# Patient Record
Sex: MALE | Race: ASIAN | NOT HISPANIC OR LATINO | URBAN - METROPOLITAN AREA
[De-identification: names, ages, dates, MRNs, and addresses within clinical notes are randomized per-mention and may not be internally consistent; named-entity substitution may affect disease eponyms.]

---

## 2017-10-04 ENCOUNTER — ALLIED HEALTH/NURSE VISIT (OUTPATIENT)
Dept: NURSING | Facility: CLINIC | Age: 32
End: 2017-10-04
Payer: COMMERCIAL

## 2017-10-04 DIAGNOSIS — Z23 NEED FOR PROPHYLACTIC VACCINATION AND INOCULATION AGAINST INFLUENZA: Primary | ICD-10-CM

## 2017-10-04 PROCEDURE — 99207 ZZC NO CHARGE NURSE ONLY: CPT

## 2017-10-04 PROCEDURE — 90686 IIV4 VACC NO PRSV 0.5 ML IM: CPT

## 2017-10-04 PROCEDURE — 90471 IMMUNIZATION ADMIN: CPT

## 2017-10-04 NOTE — PROGRESS NOTES
Injectable Influenza Immunization Documentation    1.  Is the person to be vaccinated sick today?   No    2. Does the person to be vaccinated have an allergy to a component   of the vaccine?   No    3. Has the person to be vaccinated ever had a serious reaction   to influenza vaccine in the past?   No    4. Has the person to be vaccinated ever had Guillain-Barré syndrome?   No    Form completed by Karen Rolle MA// October 4, 2017 10:25 AM

## 2017-10-04 NOTE — MR AVS SNAPSHOT
After Visit Summary   10/4/2017    Jimenez Ceron    MRN: [id number]           Patient Information     Date Of Birth          1985        Visit Information        Provider Department      10/4/2017 9:00 AM DIAN NURSE AB [facility]        Today's Diagnoses     Need for prophylactic vaccination and inoculation against influenza    -  1       Follow-ups after your visit        Who to contact     If you have questions or need follow up information about today's clinic visit or your schedule please contact [facility] directly at [phone].  Normal or non-critical lab and imaging results will be communicated to you by e-Go aeroplaneshart, letter or phone within 4 business days after the clinic has received the results. If you do not hear from us within 7 days, please contact the clinic through [employer] or phone. If you have a critical or abnormal lab result, we will notify you by phone as soon as possible.  Submit refill requests through HelpMeNow or call your pharmacy and they will forward the refill request to us. Please allow 3 business days for your refill to be completed.          Additional Information About Your Visit        MyChart Information     HelpMeNow gives you secure access to your electronic health record. If you see a primary care provider, you can also send messages to your care team and make appointments. If you have questions, please call your primary care clinic.  If you do not have a primary care provider, please call [phone] and they will assist you.        Care EveryWhere ID     This is your Care EveryWhere ID. This could be used by other organizations to access your [place] medical records  [id number]         Blood Pressure from Last 3 Encounters:   10/24/16 110/62    Weight from Last 3 Encounters:   10/24/16 164 lb 7 oz (74.6 kg)              We Performed the Following     FLU VAC, SPLIT VIRUS IM > 3 YO (QUADRIVALENT) [74601]     Vaccine Administration,  Initial [[id number]]        Primary Care Provider Office Phone # Fax #    Amanda Crawford [phone] [address] [place] [address]        Equal Access to Services     [facility]STIAN : Hadii no mujica hadsamiao Soomaali, waaxda luqadaha, qaybta kaalmada adeegyada, mono dallasn madelinekaye garcía laSherrilaura espinoza. So [facility] [phone].    ATENCIÓN: Si habla español, tiene a fischer disposición servicios gratuitos de asistencia lingüística. Llame al [phone].    We comply with applicable federal civil rights laws and [place] laws. We do not discriminate on the basis of race, color, national origin, age, disability, sex, sexual orientation, or gender identity.            Thank you!     Thank you for choosing [facility]  for your care. Our goal is always to provide you with excellent care. Hearing back from our patients is one way we can continue to improve our services. Please take a few minutes to complete the written survey that you may receive in the mail after your visit with us. Thank you!             Your Updated Medication List - Protect others around you: Learn how to safely use, store and throw away your medicines at www.disposemymeds.org.      Notice  As of 10/4/2017 11:21 AM    You have not been prescribed any medications.

## 2018-02-15 ENCOUNTER — OFFICE VISIT (OUTPATIENT)
Dept: PEDIATRICS | Facility: CLINIC | Age: 33
End: 2018-02-15
Payer: COMMERCIAL

## 2018-02-15 VITALS
HEART RATE: 117 BPM | BODY MASS INDEX: 24.57 KG/M2 | OXYGEN SATURATION: 100 % | TEMPERATURE: 100.3 F | DIASTOLIC BLOOD PRESSURE: 60 MMHG | SYSTOLIC BLOOD PRESSURE: 104 MMHG | WEIGHT: 165.9 LBS | HEIGHT: 69 IN

## 2018-02-15 DIAGNOSIS — J10.1 INFLUENZA B: ICD-10-CM

## 2018-02-15 DIAGNOSIS — R07.0 THROAT PAIN: Primary | ICD-10-CM

## 2018-02-15 LAB
DEPRECATED S PYO AG THROAT QL EIA: NORMAL
FLUAV+FLUBV AG SPEC QL: NEGATIVE
FLUAV+FLUBV AG SPEC QL: POSITIVE
SPECIMEN SOURCE: ABNORMAL
SPECIMEN SOURCE: NORMAL

## 2018-02-15 PROCEDURE — 87804 INFLUENZA ASSAY W/OPTIC: CPT | Performed by: STUDENT IN AN ORGANIZED HEALTH CARE EDUCATION/TRAINING PROGRAM

## 2018-02-15 PROCEDURE — 87081 CULTURE SCREEN ONLY: CPT | Performed by: STUDENT IN AN ORGANIZED HEALTH CARE EDUCATION/TRAINING PROGRAM

## 2018-02-15 PROCEDURE — 87880 STREP A ASSAY W/OPTIC: CPT | Performed by: STUDENT IN AN ORGANIZED HEALTH CARE EDUCATION/TRAINING PROGRAM

## 2018-02-15 PROCEDURE — 99213 OFFICE O/P EST LOW 20 MIN: CPT | Mod: GE | Performed by: STUDENT IN AN ORGANIZED HEALTH CARE EDUCATION/TRAINING PROGRAM

## 2018-02-15 RX ORDER — OSELTAMIVIR PHOSPHATE 75 MG/1
75 CAPSULE ORAL 2 TIMES DAILY
Qty: 10 CAPSULE | Refills: 0 | Status: SHIPPED | OUTPATIENT
Start: 2018-02-15 | End: 2018-02-21

## 2018-02-15 NOTE — PATIENT INSTRUCTIONS
- influenza was positive; will start tamiflu twice daily for 5 days  - wash hands often especially if touching public space  - cover mouth with a mask when in public space  - as long as you have fever, influenza can be contagious  - warm water/tea with honey for cough   - cough can persist but should improve over time  - humidified air at home

## 2018-02-15 NOTE — MR AVS SNAPSHOT
After Visit Summary   2/15/2018    Jimenez Ceron    MRN: [id number]           Patient Information     Date Of Birth          1985        Visit Information        Provider Department      2/15/2018 11:15 AM Clifford Wylie MD [facility]        Today's Diagnoses     Fever    -  1    Throat pain        Influenza B          Care Instructions    - influenza was positive; will start tamiflu twice daily for 5 days  - wash hands often especially if touching public space  - cover mouth with a mask when in public space  - as long as you have fever, influenza can be contagious  - warm water/tea with honey for cough   - cough can persist but should improve over time  - humidified air at home          Follow-ups after your visit        Follow-up notes from your care team     Return if symptoms worsen or fail to improve.      Who to contact     If you have questions or need follow up information about today's clinic visit or your schedule please contact [facility] directly at [phone].  Normal or non-critical lab and imaging results will be communicated to you by Doctolibhart, letter or phone within 4 business days after the clinic has received the results. If you do not hear from us within 7 days, please contact the clinic through [employer] or phone. If you have a critical or abnormal lab result, we will notify you by phone as soon as possible.  Submit refill requests through [employer]ent or call your pharmacy and they will forward the refill request to us. Please allow 3 business days for your refill to be completed.          Additional Information About Your Visit        MyChart Information     Num[employer]ent gives you secure access to your electronic health record. If you see a primary care provider, you can also send messages to your care team and make appointments. If you have questions, please call your primary care clinic.  If you do not have a primary care provider, please call  "[phone] and they will assist you.        Care EveryWhere ID     This is your Care EveryWhere ID. This could be used by other organizations to access your [place] medical records  [id number]        Your Vitals Were     Pulse Temperature Height Pulse Oximetry BMI (Body Mass Index)       117 100.3  F (37.9  C) (Oral) 5' 8.9\" (1.75 m) 100% 24.57 kg/m2        Blood Pressure from Last 3 Encounters:   02/15/18 104/60   10/24/16 110/62    Weight from Last 3 Encounters:   02/15/18 165 lb 14.4 oz (75.3 kg)   10/24/16 164 lb 7 oz (74.6 kg)              We Performed the Following     Beta strep group A culture     Influenza A/B antigen     Rapid strep screen          Today's Medication Changes          These changes are accurate as of 2/15/18 11:46 AM.  If you have any questions, ask your nurse or doctor.               Start taking these medicines.        Dose/Directions    oseltamivir 75 MG capsule   Commonly known as:  TAMIFLU   Used for:  Influenza B   Started by:  Clifford Wylie MD        Dose:  75 mg   Take 1 capsule (75 mg) by mouth 2 times daily   Quantity:  10 capsule   Refills:  0            Where to get your medicines      These medications were sent to [facility] - [place] - [address]  AT [address]  [address] [place] [address]     Phone:  [phone]     oseltamivir 75 MG capsule                Primary Care Provider Office Phone # Fax #    Amanda Crawford [phone] [address] [place] [address]        Equal Access to Services     [facility] AH: Hadii aad ku hadasho Soomaali, waaxda luqadaha, qaybta kaalmada mono guthrie. So [facility] [phone].    ATENCIÓN: Si habla español, tiene a fischer disposición servicios gratuitos de asistencia lingüística. Llame al [phone].    We comply with applicable federal civil rights laws and [place] laws. We do not discriminate on " the basis of race, color, national origin, age, disability, sex, sexual orientation, or gender identity.            Thank you!     Thank you for choosing [facility] [place]  for your care. Our goal is always to provide you with excellent care. Hearing back from our patients is one way we can continue to improve our services. Please take a few minutes to complete the written survey that you may receive in the mail after your visit with us. Thank you!             Your Updated Medication List - Protect others around you: Learn how to safely use, store and throw away your medicines at www.disposemymeds.org.          This list is accurate as of 2/15/18 11:46 AM.  Always use your most recent med list.                   Brand Name Dispense Instructions for use Diagnosis    oseltamivir 75 MG capsule    TAMIFLU    10 capsule    Take 1 capsule (75 mg) by mouth 2 times daily    Influenza B

## 2018-02-15 NOTE — PROGRESS NOTES
SUBJECTIVE:   Jimenez Ceron is a 32 year old male who presents to clinic today for the following health issues:      RESPIRATORY SYMPTOMS      Duration: 2 days    Description  sore throat, cough, fever, chills and fatigue/malaise    Severity: severe    Accompanying signs and symptoms: None    History (predisposing factors):  none    Precipitating or alleviating factors: None    Therapies tried and outcome:  acetaminophen    Mr. Pb Baires is a 32-year-old male with significant past medical history who presents to clinic with a 2 day history of cough sore throat fever up to 102 at home. He also endorses chills, fatigue, myalgia, nausea; however denies arthralgia, vomiting, shortness of breath, chest pain, abdominal pain, diarrhea, or rash. He has been taking Tylenol for pain and the last dose was last night. No recent use of antibiotics. No known sick exposure at home or work. Received flu vaccine this year.     Problem list and histories reviewed & adjusted, as indicated.  Additional history: as documented    Patient Active Problem List   Diagnosis     Dyslipidemia     Past Surgical History:   Procedure Laterality Date     THORACOTOMY Right 1992    to remove empyema       Social History   Substance Use Topics     Smoking status: Never Smoker     Smokeless tobacco: Never Used     Alcohol use 0.0 oz/week     0 Standard drinks or equivalent per week      Comment: occasionally     Family History   Problem Relation Age of Onset     DIABETES Father      Hypothyroidism Brother          Current Outpatient Prescriptions   Medication Sig Dispense Refill     oseltamivir (TAMIFLU) 75 MG capsule Take 1 capsule (75 mg) by mouth 2 times daily 10 capsule 0     No Known Allergies    Reviewed and updated as needed this visit by clinical staff  Tobacco  Allergies  Meds  Problems  Med Hx  Surg Hx  Fam Hx  Soc Hx        Reviewed and updated as needed this visit by Provider  Allergies  Meds  Problems         ROS:  -  "please refer to HPI for complete ROS    OBJECTIVE:     /60 (BP Location: Right arm, Patient Position: Chair, Cuff Size: Adult Regular)  Pulse 117  Temp 100.3  F (37.9  C) (Oral)  Ht 5' 8.9\" (1.75 m)  Wt 165 lb 14.4 oz (75.3 kg)  SpO2 100%  BMI 24.57 kg/m2  Body mass index is 24.57 kg/(m^2).     GENERAL: Alert, appears tired and fatigued  EYES: Eyes grossly normal to inspection, conjunctivae and sclerae normal  HENT: ear canals and TM's normal, MMM, nose and mouth without ulcers or lesions, non-erythematous oropharynx  NECK: no adenopathy, no asymmetry, masses, or scars  RESP: lungs clear to auscultation - no rales, rhonchi or wheezes  CV: regular rate and rhythm, normal S1 S2, no S3 or S4, no murmur, click or rub, no peripheral edema and peripheral pulses strong  PSYCH: mentation appears normal, affect normal    Diagnostic Test Results:  Influenza - positive for B  Strep screen - Negative    ASSESSMENT/PLAN:   1. Fever  Fever up to 102 F at home and temperature at clinic today is 100.3 F. Last dose of tylenol was 2/14/18. With fever and other symptoms consistent with influenza, will test for flu  - Influenza A/B antigen    2. Throat pain  - Rapid strep screen  - Beta strep group A culture    3. Influenza B  - oseltamivir (TAMIFLU) 75 MG capsule; Take 1 capsule (75 mg) by mouth 2 times daily  Dispense: 10 capsule; Refill: 0    FUTURE APPOINTMENTS:       - Follow-up visit PRN    Patient was discussed with Dr. Ta, who agrees with the plan above    Clifford Wylie MD  [facility] [place]    I have discussed this patient with Dr. Wylie and agree with joint documentation and plan as noted above.    Josue Ta MD  Attending Internal Medicine/Pediatrics Physician    "

## 2018-02-15 NOTE — NURSING NOTE
"Chief Complaint   Patient presents with     URI       Initial /60 (BP Location: Right arm, Patient Position: Chair, Cuff Size: Adult Regular)  Pulse 117  Temp 100.3  F (37.9  C) (Oral)  Ht 5' 8.9\" (1.75 m)  Wt 165 lb 14.4 oz (75.3 kg)  SpO2 100%  BMI 24.57 kg/m2 Estimated body mass index is 24.57 kg/(m^2) as calculated from the following:    Height as of this encounter: 5' 8.9\" (1.75 m).    Weight as of this encounter: 165 lb 14.4 oz (75.3 kg).  Medication Reconciliation: complete   Isela FLETCHER      "

## 2018-02-16 LAB
BACTERIA SPEC CULT: NORMAL
SPECIMEN SOURCE: NORMAL

## 2018-02-21 ENCOUNTER — OFFICE VISIT (OUTPATIENT)
Dept: PEDIATRICS | Facility: CLINIC | Age: 33
End: 2018-02-21
Payer: COMMERCIAL

## 2018-02-21 VITALS
HEIGHT: 69 IN | SYSTOLIC BLOOD PRESSURE: 90 MMHG | TEMPERATURE: 97 F | OXYGEN SATURATION: 98 % | HEART RATE: 83 BPM | BODY MASS INDEX: 24.17 KG/M2 | DIASTOLIC BLOOD PRESSURE: 56 MMHG | WEIGHT: 163.2 LBS

## 2018-02-21 DIAGNOSIS — Z87.09 HISTORY OF INFLUENZA: ICD-10-CM

## 2018-02-21 DIAGNOSIS — R05.8 POST-VIRAL COUGH SYNDROME: Primary | ICD-10-CM

## 2018-02-21 PROCEDURE — 99213 OFFICE O/P EST LOW 20 MIN: CPT | Mod: GE | Performed by: STUDENT IN AN ORGANIZED HEALTH CARE EDUCATION/TRAINING PROGRAM

## 2018-02-21 NOTE — MR AVS SNAPSHOT
After Visit Summary   2/21/2018    Jimenez Ceron    MRN: [id number]           Patient Information     Date Of Birth          1985        Visit Information        Provider Department      2/21/2018 8:00 AM Clifford Wylie MD [facility]        Today's Diagnoses     Post-viral cough syndrome    -  1    History of influenza          Care Instructions    - continue drinking warm water/tea with honey  - ok to use tylenol or ibuprofen as needed for pain  - will monitor for symptoms for about a week; if it is improving, then will continue to monitor as outpatient; if not improving or worsening, will do a trial of tessalon or robitussin with codeine for cough  - continue with good hand hygiene at home and work              Follow-ups after your visit        Follow-up notes from your care team     Return if symptoms worsen or fail to improve.      Who to contact     If you have questions or need follow up information about today's clinic visit or your schedule please contact [facility] directly at [phone].  Normal or non-critical lab and imaging results will be communicated to you by P[employer] Solutionshart, letter or phone within 4 business days after the clinic has received the results. If you do not hear from us within 7 days, please contact the clinic through [employer] or phone. If you have a critical or abnormal lab result, we will notify you by phone as soon as possible.  Submit refill requests through [employer] or call your pharmacy and they will forward the refill request to us. Please allow 3 business days for your refill to be completed.          Additional Information About Your Visit        Ponte Solutionshart Information     [employer] gives you secure access to your electronic health record. If you see a primary care provider, you can also send messages to your care team and make appointments. If you have questions, please call your primary care clinic.  If you do not have a primary care  "provider, please call [phone] and they will assist you.        Care EveryWhere ID     This is your Care EveryWhere ID. This could be used by other organizations to access your [place] medical records  [id number]        Your Vitals Were     Pulse Temperature Height Pulse Oximetry BMI (Body Mass Index)       83 97  F (36.1  C) (Tympanic) 5' 9\" (1.753 m) 98% 24.1 kg/m2        Blood Pressure from Last 3 Encounters:   02/21/18 90/56   02/15/18 104/60   10/24/16 110/62    Weight from Last 3 Encounters:   02/21/18 163 lb 3.2 oz (74 kg)   02/15/18 165 lb 14.4 oz (75.3 kg)   10/24/16 164 lb 7 oz (74.6 kg)              Today, you had the following     No orders found for display       Primary Care Provider Office Phone # Fax #    Amanda Crawford [phone] [address] [place] [address]        Equal Access to Services     [facility]: Hadii no ku hadasho Soomaali, waaxda luqadaha, qaybta kaalmada adeegyada, mono rosas . So [facility] [phone].    ATENCIÓN: Si habla español, tiene a fischer disposición servicios gratuitos de asistencia lingüística. Llame al [phone].    We comply with applicable federal civil rights laws and [place] laws. We do not discriminate on the basis of race, color, national origin, age, disability, sex, sexual orientation, or gender identity.            Thank you!     Thank you for choosing [facility] [place]  for your care. Our goal is always to provide you with excellent care. Hearing back from our patients is one way we can continue to improve our services. Please take a few minutes to complete the written survey that you may receive in the mail after your visit with us. Thank you!             Your Updated Medication List - Protect others around you: Learn how to safely use, store and throw away your medicines at www.disposemymeds.org.      Notice  As of 2/21/2018  8:36 AM    You have not been prescribed any " medications.

## 2018-02-21 NOTE — PROGRESS NOTES
SUBJECTIVE:   Jimenez Ceron is a 32 year old male who presents to clinic today for the following health issues:      Sore throat      Duration: 1 week    Description (location/character/radiation): sore throat and cough    Intensity:  mild, moderate    Accompanying signs and symptoms: lack of appetite, inability to taste foods    History (similar episodes/previous evaluation): recent treatment for Influenza    Precipitating or alleviating factors: None    Therapies tried and outcome: Tamiflu     Mr. Ceron is a 33 yo M w/ recent dx of influenza B infection who presents to clinic today for a f/u. Finished tamiflu. Fever has resolved but still has sore throat and cough. Has been using water with honey and tea. Burning sensation with swallowing. Still eating and drinking fluid. Also, mild amount of mucus production as well. Denies nausea, vomiting, abdominal pain, diarrhea, SOB.     Problem list and histories reviewed & adjusted, as indicated.  Additional history: as documented    Patient Active Problem List   Diagnosis     Dyslipidemia     Past Surgical History:   Procedure Laterality Date     THORACOTOMY Right 1992    to remove empyema       Social History   Substance Use Topics     Smoking status: Never Smoker     Smokeless tobacco: Never Used     Alcohol use 0.0 oz/week     0 Standard drinks or equivalent per week      Comment: occasionally     Family History   Problem Relation Age of Onset     DIABETES Father      Hypothyroidism Brother          No current outpatient prescriptions on file.     No Known Allergies    Reviewed and updated as needed this visit by clinical staff  Tobacco  Allergies  Meds  Problems  Med Hx  Surg Hx  Fam Hx  Soc Hx        Reviewed and updated as needed this visit by Provider  Allergies  Meds  Problems         ROS:  CONSTITUTIONAL: NEGATIVE for fever  INTEGUMENTARY/SKIN: NEGATIVE for worrisome rashes  ENT/MOUTH: POSITIVE for sore throat and mucus production  RESP:  "POSITIVE for cough; NEGATIVE for SOB  GI: NEGATIVE for nausea, vomiting, abdominal pain, diarrhea  MUSCULOSKELETAL: NEGATIVE for significant arthralgias or myalgia      OBJECTIVE:     BP 90/56 (BP Location: Right arm, Patient Position: Sitting, Cuff Size: Adult Regular)  Pulse 83  Temp 97  F (36.1  C) (Tympanic)  Ht 5' 9\" (1.753 m)  Wt 163 lb 3.2 oz (74 kg)  SpO2 98%  BMI 24.1 kg/m2  Body mass index is 24.1 kg/(m^2).     GENERAL: healthy, alert and no distress  EYES: Eyes grossly normal to inspection, conjunctivae and sclerae normal  HENT: No nasal discharge, MMM, non-erythematous oropharynx  NECK: no adenopathy, no asymmetry, masses, or scars  RESP: lungs clear to auscultation - no rales, rhonchi or wheezes  CV: regular rate and rhythm, normal S1 S2, no S3 or S4, no murmur, click or rub, no peripheral edema and peripheral pulses strong  NEURO: Normal strength and tone, mentation intact and speech normal  PSYCH: mentation appears normal, affect normal/bright    Diagnostic Test Results:  none     ASSESSMENT/PLAN:   1. Post-viral cough syndrome  Presenting with lingering cough and sore throat likely 2/2 recent influenza infection. Has been trying warm water/tea with honey. Cough is the most common symptom that persists after viral infection and this was discussed with patient. Since it is affecting his sleep somewhat, trial of robitussin with codeine or tessalon was discussed; however, patient would like to monitor for another week before trying new medication  - continue with good hand hygiene at home and work  - continue drinking warm water/tea with honey  - ok to use tylenol or ibuprofen as needed for pain  - will monitor for symptoms for about a week; if it is improving, then will continue to monitor as outpatient; if not improving or worsening, will do a trial of tessalon or robitussin with codeine for cough    2. History of influenza  Finished with tamiflu  - continue to monitor symptoms      FUTURE " APPOINTMENTS:       - Follow-up visit PRN    Patient was discussed with Dr. Anthony Wylie MD  [facility]      I discussed this case in depth with Dr. Wylie and agree with the key components of the history, assessment and plan.      Constance Cruz MD  Internal Medicine/Pediatrics

## 2018-02-21 NOTE — PATIENT INSTRUCTIONS
- continue drinking warm water/tea with honey  - ok to use tylenol or ibuprofen as needed for pain  - will monitor for symptoms for about a week; if it is improving, then will continue to monitor as outpatient; if not improving or worsening, will do a trial of tessalon or robitussin with codeine for cough  - continue with good hand hygiene at home and work

## 2018-08-10 ENCOUNTER — OFFICE VISIT (OUTPATIENT)
Dept: PEDIATRICS | Facility: CLINIC | Age: 33
End: 2018-08-10
Payer: COMMERCIAL

## 2018-08-10 VITALS
HEIGHT: 69 IN | SYSTOLIC BLOOD PRESSURE: 108 MMHG | DIASTOLIC BLOOD PRESSURE: 60 MMHG | OXYGEN SATURATION: 97 % | HEART RATE: 73 BPM | WEIGHT: 165 LBS | BODY MASS INDEX: 24.44 KG/M2 | TEMPERATURE: 97.7 F

## 2018-08-10 DIAGNOSIS — M79.672 PAIN OF LEFT HEEL: Primary | ICD-10-CM

## 2018-08-10 PROCEDURE — 99213 OFFICE O/P EST LOW 20 MIN: CPT | Performed by: INTERNAL MEDICINE

## 2018-08-10 NOTE — PATIENT INSTRUCTIONS
Buy a set of heel cups.  Do not walk barefoot.  Use sneakers with the heel cups.    Ice the heel three times daily for 15 min or so.    Begin aleve (naproxen) 220 mg twice daily for 2 weeks.    Follow up with podiatry if symptoms persist.    Pradeep Livingston MD  Internal Medicine and Pediatrics

## 2018-08-10 NOTE — PROGRESS NOTES
SUBJECTIVE:   Jimenez Ceron is a 32 year old male who presents to clinic today for the following health issues:      Musculoskeletal problem/pain      Duration: one month    Description  Location: left foot pain    Intensity:  mild    Accompanying signs and symptoms: none    History  Previous similar problem: no   Previous evaluation:  none    Precipitating or alleviating factors:  Trauma or overuse: no   Aggravating factors include: walking and flexing foot    Therapies tried and outcome: soaking in warm water, heat pack      Left lateral ankle pain about 1 month ago.  No known injury.  No change in footwear.  Has tried heating packs a few times.  Not better or worse for last 1 month.      Problem list and histories reviewed & adjusted, as indicated.  Additional history: as documented    Patient Active Problem List   Diagnosis     Dyslipidemia     Past Surgical History:   Procedure Laterality Date     THORACOTOMY Right 1992    to remove empyema       Social History   Substance Use Topics     Smoking status: Never Smoker     Smokeless tobacco: Never Used     Alcohol use 0.0 oz/week     0 Standard drinks or equivalent per week      Comment: occasionally     Family History   Problem Relation Age of Onset     Diabetes Father      Hypothyroidism Brother          Current Outpatient Prescriptions   Medication Sig Dispense Refill     Multiple Vitamins-Minerals (MULTIVITAMIN PO)        No Known Allergies  BP Readings from Last 3 Encounters:   08/10/18 108/60   02/21/18 90/56   02/15/18 104/60    Wt Readings from Last 3 Encounters:   08/10/18 165 lb (74.8 kg)   02/21/18 163 lb 3.2 oz (74 kg)   02/15/18 165 lb 14.4 oz (75.3 kg)                  Labs reviewed in EPIC    Reviewed and updated as needed this visit by clinical staff       Reviewed and updated as needed this visit by Provider         ROS:  CONSTITUTIONAL: NEGATIVE for fever, chills, change in weight  ENT/MOUTH: NEGATIVE for ear, mouth and throat  "problems  RESP: NEGATIVE for significant cough or SOB  CV: NEGATIVE for chest pain, palpitations or peripheral edema    OBJECTIVE:                                                    /60 (BP Location: Right arm, Cuff Size: Adult Regular)  Pulse 73  Temp 97.7  F (36.5  C) (Oral)  Ht 5' 9\" (1.753 m)  Wt 165 lb (74.8 kg)  SpO2 97%  BMI 24.37 kg/m2  Body mass index is 24.37 kg/(m^2).   GENERAL: healthy, alert, well nourished, well hydrated, no distress  EXTR: left heel pain to walking; some radiation to left lateral metatarsal and lateral heel.  No ankle pain to range of motion.     Diagnostic test results:  Diagnostic Test Results:  none      ASSESSMENT/PLAN:                                                    1. Pain of left heel    Will hold on xrays until evaluation by podiatry, if necessary.       Patient Instructions   Buy a set of heel cups.  Do not walk barefoot.  Use sneakers with the heel cups.    Ice the heel three times daily for 15 min or so.    Begin aleve (naproxen) 220 mg twice daily for 2 weeks.    Follow up with podiatry if symptoms persist.    Pradeep Livingston MD  Internal Medicine and Pediatrics        - ORTHO [facility] REFERRAL      See Patient Instructions    Pradeep Livingston MD  [facility]    "

## 2018-08-10 NOTE — MR AVS SNAPSHOT
After Visit Summary   8/10/2018    Jimenez Ceron    MRN: [id number]           Patient Information     Date Of Birth          1985        Visit Information        Provider Department      8/10/2018 7:40 AM Pradeep Livingston MD [facility]        Today's Diagnoses     Pain of left heel    -  1      Care Instructions    Buy a set of heel cups.  Do not walk barefoot.  Use sneakers with the heel cups.    Ice the heel three times daily for 15 min or so.    Begin aleve (naproxen) 220 mg twice daily for 2 weeks.    Follow up with podiatry if symptoms persist.    Pradeep Livingston MD  Internal Medicine and Pediatrics             Follow-ups after your visit        Additional Services     ORTHO  REFERRAL       [facility] is referring you to the Orthopedic  Services at [facility].       The  Representative will assist you in the coordination of your Orthopedic and Musculoskeletal Care as prescribed by your physician.    The  Representative will call you within 1 business day to help schedule your appointment, or you may contact the [facility] Representative at:    All areas ~ [phone]     Type of Referral : [facility]le [facility]       Timeframe requested: 3 - 5 days    Coverage of these services is subject to the terms and limitations of your health insurance plan.  Please call member services at your health plan with any benefit or coverage questions.      If X-rays, CT or MRI's have been performed, please contact the facility where they were done to arrange for , prior to your scheduled appointment.  Please bring this referral request to your appointment and present it to your specialist.                  Follow-up notes from your care team     Return in about 4 weeks (around 9/7/2018) for Medical Check Up.      Who to contact     If you have questions or need follow up information about  "today's clinic visit or your schedule please contact [facility] directly at [phone].  Normal or non-critical lab and imaging results will be communicated to you by MyChart, letter or phone within 4 business days after the clinic has received the results. If you do not hear from us within 7 days, please contact the clinic through Intellinotehart or phone. If you have a critical or abnormal lab result, we will notify you by phone as soon as possible.  Submit refill requests through emoteShare or call your pharmacy and they will forward the refill request to us. Please allow 3 business days for your refill to be completed.          Additional Information About Your Visit        IntellinoteharLema21 Information     emoteShare gives you secure access to your electronic health record. If you see a primary care provider, you can also send messages to your care team and make appointments. If you have questions, please call your primary care clinic.  If you do not have a primary care provider, please call [phone] and they will assist you.        Care EveryWhere ID     This is your Care EveryWhere ID. This could be used by other organizations to access your [facility] medical records  [id number]        Your Vitals Were     Pulse Temperature Height Pulse Oximetry BMI (Body Mass Index)       73 97.7  F (36.5  C) (Oral) 5' 9\" (1.753 m) 97% 24.37 kg/m2        Blood Pressure from Last 3 Encounters:   08/10/18 108/60   02/21/18 90/56   02/15/18 104/60    Weight from Last 3 Encounters:   08/10/18 165 lb (74.8 kg)   02/21/18 163 lb 3.2 oz (74 kg)   02/15/18 165 lb 14.4 oz (75.3 kg)              We Performed the Following     ORTHO  REFERRAL        Primary Care Provider Office Phone # Fax #    Amanda Crawford [phone] [address] [place] [address]        Equal Access to Services     HARRIET LUBIN AH: Hadii no ayalao Soconrad, waaxda luqadaha, qaybta kaalmono kennedy " valencia winstonmonicaesther bryanSherriaajosé antonio ah. So [facility] [phone].    ATENCIÓN: Si habla erik, tiene a fischer disposición servicios gratuitos de asistencia lingüística. Huang al [phone].    We comply with applicable federal civil rights laws and [place] laws. We do not discriminate on the basis of race, color, national origin, age, disability, sex, sexual orientation, or gender identity.            Thank you!     Thank you for choosing [facility]  for your care. Our goal is always to provide you with excellent care. Hearing back from our patients is one way we can continue to improve our services. Please take a few minutes to complete the written survey that you may receive in the mail after your visit with us. Thank you!             Your Updated Medication List - Protect others around you: Learn how to safely use, store and throw away your medicines at www.disposemymeds.org.          This list is accurate as of 8/10/18  7:56 AM.  Always use your most recent med list.                   Brand Name Dispense Instructions for use Diagnosis    MULTIVITAMIN PO

## 2018-08-20 ENCOUNTER — OFFICE VISIT (OUTPATIENT)
Dept: PODIATRY | Facility: CLINIC | Age: 33
End: 2018-08-20
Payer: COMMERCIAL

## 2018-08-20 VITALS — HEIGHT: 69 IN | RESPIRATION RATE: 16 BRPM | WEIGHT: 165 LBS | BODY MASS INDEX: 24.44 KG/M2

## 2018-08-20 DIAGNOSIS — M79.672 ARCH PAIN, LEFT: Primary | ICD-10-CM

## 2018-08-20 PROCEDURE — 99243 OFF/OP CNSLTJ NEW/EST LOW 30: CPT | Performed by: PODIATRIST

## 2018-08-20 NOTE — MR AVS SNAPSHOT
After Visit Summary   8/20/2018    Jimenez Ceron    MRN: [id number]           Patient Information     Date Of Birth          1985        Visit Information        Provider Department      8/20/2018 3:45 PM Serge Hebert DPM [facility]        Care Instructions    Thank you for choosing [facility] / Foot & [facility]le Surgery!    DR. HEBERT'S CLINIC LOCATIONS:   MONDAY - [place] TUESDAY - [place]   [address]  [address]   [place] [address] [place] [address]   [phone]       THURSDAY AM - [place] THURSDAY PM - [place]   [address]   [place] [address] [place], [place] [address] [phone]       FRIDAY AM - [place] SET UP SURGERY: [phone] [address] APPOINTMENTS: [phone]   [place] [address] BILLING QUESTIONS: [phone] FAX NUMBER: [phone]     Follow Up: as needed    OVER THE COUNTER INSERTS    SuperFeet   Sofsole Fit Spenco   Power Step   Walk-Fit Arch Cradles     Most of these can be found at your local [employer], [employer], [email or web address], or online:    1.  https://[email or web address] Management Services/en-us/  2.  Https://Positionly/  3.  Https://www.GrasswiresInSphero/    **A good high quality over the counter insert should cost around $40-$50            PRICE THERAPY  Many aches and pains throughout the foot and ankle can be helped with many simple treatments. This is usually described as PRICE Therapy.      P - Protection - often times, inflammation/pain in the lower extremity is not able to improve simply because the areas involved are never allowed to rest. Every step we take can bother the problematic area. Protecting those areas is an important step in the healing process. This may involve a walking cast boot, a special insert/orthotic device, an ankle brace, or simply avoiding barefoot walking.    R - Rest - in  addition to protecting the foot/ankle, resting is an important, but often times difficult, treatment option. Getting off your feet when they bother you, and specifically avoiding activities that cause pain/discomfort, are very beneficial to prevent, and treat, foot/ankle pain.      I - Ice - icing regularly can help to decrease inflammation and swelling in the foot, thus decreasing pain. Using an ice pack or a bag of frozen veggies works very well. Ice for 20 minutes multiple times per day as needed.  Do not place the ice directly on the skin as this can cause tissue damage.    C - Compression - using a compression wrap or an ACE wrap can help to decrease swelling, which can help to decrease pain. Wearing the wraps is generally not needed at night, but they should be worn on a regular basis when you are going to be on your feet for prolonged periods as gravity tends to pull fluids down to your feet/ankles.    E - Elevation - elevating your lower extremities multiple times daily for 15-20 minutes can help to decrease swelling, which works well in decreasing pain levels.    NSAID/Tylenol - Anti-inflammatories like Aleve or ibuprofen, and/or a pain medication, such as Tylenol, can help to improve pain levels and get the issue resolved sooner rather than later. Anyone with liver issues should be careful with Tylenol, and anyone with high blood pressure or heart, stomach or kidney issues should be careful with anti-inflammatories. Please ask if you have questions about these medications, including dosage.      Body Mass Index (BMI)  Many things can cause foot and ankle problems. Foot structure, activity level, foot mechanics and injuries are common causes of pain. One very important issue that often goes unmentioned, is body weight. Extra weight can cause increased stress on muscles, ligaments, bones and tendons. Sometimes just a few extra pounds is all it takes to put one over her/his threshold. Without reducing that  "stress, it can be difficult to alleviate pain. Some people are uncomfortable addressing this issue, but we feel it is important for you to think about it. As Foot &  Ankle specialists, our job is addressing the lower extremity problem and possible causes. Regarding extra body weight, we encourage patients to discuss diet and weight management plans with their primary care doctors. It is this team approach that gives you the best opportunity for pain relief and getting you back on your feet.            Follow-ups after your visit        Who to contact     If you have questions or need follow up information about today's clinic visit or your schedule please contact [facility] directly at [phone].  Normal or non-critical lab and imaging results will be communicated to you by Decohunthart, letter or phone within 4 business days after the clinic has received the results. If you do not hear from us within 7 days, please contact the clinic through Cookappt or phone. If you have a critical or abnormal lab result, we will notify you by phone as soon as possible.  Submit refill requests through farmhopping or call your pharmacy and they will forward the refill request to us. Please allow 3 business days for your refill to be completed.          Additional Information About Your Visit        farmhopping Information     farmhopping gives you secure access to your electronic health record. If you see a primary care provider, you can also send messages to your care team and make appointments. If you have questions, please call your primary care clinic.  If you do not have a primary care provider, please call [phone] and they will assist you.        Care EveryWhere ID     This is your Care EveryWhere ID. This could be used by other organizations to access your [facility] medical records  [id number]        Your Vitals Were     Respirations Height BMI (Body Mass Index)             16 5' 9\" (1.753 m) 24.37 kg/m2          Blood " Pressure from Last 3 Encounters:   08/10/18 108/60   02/21/18 90/56   02/15/18 104/60    Weight from Last 3 Encounters:   08/20/18 165 lb (74.8 kg)   08/10/18 165 lb (74.8 kg)   02/21/18 163 lb 3.2 oz (74 kg)              Today, you had the following     No orders found for display       Primary Care Provider Office Phone # Fax #    Amanda Crawford [phone] [address] [place] [address]        Equal Access to Services     [facility]: Hadii aad ku hadasho Soconrad, waaxda luqadaha, qaybta kaalmada jerri, mono rosas . So [facility] [phone].    ATENCIÓN: Si habla español, tiene a fischer disposición servicios gratuitos de asistencia lingüística. Llame al [phone].    We comply with applicable federal civil rights laws and [place] laws. We do not discriminate on the basis of race, color, national origin, age, disability, sex, sexual orientation, or gender identity.            Thank you!     [facility]. Our goal is always to provide you with excellent care. Hearing back from our patients is one way we can continue to improve our services. Please take a few minutes to complete the written survey that you may receive in the mail after your visit with us. Thank you!             Your Updated Medication List - Protect others around you: Learn how to safely use, store and throw away your medicines at www.disposemymeds.org.          This list is accurate as of 8/20/18  4:01 PM.  Always use your most recent med list.                   Brand Name Dispense Instructions for use Diagnosis    MULTIVITAMIN PO

## 2018-08-20 NOTE — PROGRESS NOTES
"Foot & Ankle Surgery  August 20, 2018    CC: \"left foot\"    I was asked to see Jimenez Ceron regarding the chief complaint by:  Dr. EFRAIN Livingston    HPI:  Pt is a 32 year old male who presents with above complaint.  Left arch/foot pain x 30 days.  Pain 5/10 \"regularly\", he has tried heating pad, soaking.  Improving last few days, \"almost gone\".  Trip to [place], now compensatory gait, walking on side of foot.  Minimal morning pain but has post-static dyskinesia    ROS:   Pos for CC.  The patient denies current nausea, vomiting, chills, fevers, belly pain, calf pain, chest pain or SOB.  Complete remainder of ROS is otherwise neg.    VITALS:    Vitals:    08/20/18 1549   Resp: 16   Weight: 165 lb (74.8 kg)   Height: 5' 9\" (1.753 m)       PMH:  No past medical history on file.    SXHX:    Past Surgical History:   Procedure Laterality Date     THORACOTOMY Right 1992    to remove empyema        MEDS:    Current Outpatient Prescriptions   Medication     Multiple Vitamins-Minerals (MULTIVITAMIN PO)     No current facility-administered medications for this visit.        ALL:   No Known Allergies    FMH:    Family History   Problem Relation Age of Onset     Diabetes Father      Hypothyroidism Brother        SocHx:    Social History     Social History     Marital status:      Spouse name: N/A     Number of children: N/A     Years of education: N/A     Occupational History     Not on file.     Social History Main Topics     Smoking status: Never Smoker     Smokeless tobacco: Never Used     Alcohol use 0.0 oz/week     0 Standard drinks or equivalent per week      Comment: occasionally     Drug use: No     Sexual activity: Yes     Partners: Female     Other Topics Concern     Parent/Sibling W/ Cabg, Mi Or Angioplasty Before 65f 55m? No     Social History Narrative           EXAMINATION:  Gen:   No apparent distress  Neuro:   A&Ox3, no deficits  Psych:    Answering questions appropriately for age and situation with " normal affect  Head:    NCAT  Eye:    Visual scanning without deficit  Ear:    Response to auditory stimuli wnl  Lung:    Non-labored breathing on RA noted  Abd:    NTND per patient report  Lymph:    Neg for pitting/non-pitting edema BLE  Vasc:    Pulses palpable, CFT minimally delayed  Neuro:    Light touch sensation intact to all sensory nerve distributions without paresthesias  Derm:    Neg for nodules, lesions or ulcerations  MSK:    Left lower extremity - points to lateral rearfoot along lateral band of plantar fascia.  No pain on rearfoot/heel exam today, however.  Calf:    Neg for redness, swelling or tenderness      Assessment:  32 year old male with plantar left arch pain       Plan:  Discussed etiologies, anatomy and options  1.  Plantar left arch pain  -comfortable shoes; minimize shoeless ambulation  -OTC insert for arch support  -RICE/NSAID prn; discussed importance of activity modifications based on pain  -consider walking cast boot    Follow up:  prn or sooner with acute issues      Patient's medical history was reviewed today    Body mass index is 24.37 kg/(m^2).          Serge Agee DPM FACFAS FACFAOM  Podiatric Foot & Ankle Surgeon  [facility]  [phone]

## 2018-08-20 NOTE — PATIENT INSTRUCTIONS
Thank you for choosing [facility] / [facility] & [facility]le Surgery!    DR. HEBERT'S CLINIC LOCATIONS:   MONDAY - [place] TUESDAY - [place]   [address]  [address] [place]   [place] [address] [place], [place] [id number] [phone]       THURSDAY AM - [place] THURSDAY PM - [place]   [address]   [place] [address] [place], [place] [address] [phone]       FRIDAY AM - [place] SET UP SURGERY: [phone] [address] APPOINTMENTS: [phone]   [place] [address] BILLING QUESTIONS: [phone] FAX NUMBER: [phone]     Follow Up: as needed    OVER THE COUNTER INSERTS    "[employer] Fit Roundrate   Power Step   Walk-Fit Arch Cradles     Most of these can be found at your local [employer], [employer]erve [employer]., Flut[employer], or online:    1.  https://[email or web address].Brainloop/en-us/  2.  Https://The 19th Floor/  3.  Https://www.powersteps.com/    **A good high quality over the counter insert should cost around $40-$50            PRICE THERAPY  Many aches and pains throughout the foot and ankle can be helped with many simple treatments. This is usually described as PRICE Therapy.      P - Protection - often times, inflammation/pain in the lower extremity is not able to improve simply because the areas involved are never allowed to rest. Every step we take can bother the problematic area. Protecting those areas is an important step in the healing process. This may involve a walking cast boot, a special insert/orthotic device, an ankle brace, or simply avoiding barefoot walking.    R - Rest - in addition to protecting the foot/ankle, resting is an important, but often times difficult, treatment option. Getting off your feet when they bother you, and specifically avoiding activities that cause pain/discomfort, are very beneficial to prevent, and treat, foot/ankle pain.      I - Ice - icing regularly can  help to decrease inflammation and swelling in the foot, thus decreasing pain. Using an ice pack or a bag of frozen veggies works very well. Ice for 20 minutes multiple times per day as needed.  Do not place the ice directly on the skin as this can cause tissue damage.    C - Compression - using a compression wrap or an ACE wrap can help to decrease swelling, which can help to decrease pain. Wearing the wraps is generally not needed at night, but they should be worn on a regular basis when you are going to be on your feet for prolonged periods as gravity tends to pull fluids down to your feet/ankles.    E - Elevation - elevating your lower extremities multiple times daily for 15-20 minutes can help to decrease swelling, which works well in decreasing pain levels.    NSAID/Tylenol - Anti-inflammatories like Aleve or ibuprofen, and/or a pain medication, such as Tylenol, can help to improve pain levels and get the issue resolved sooner rather than later. Anyone with liver issues should be careful with Tylenol, and anyone with high blood pressure or heart, stomach or kidney issues should be careful with anti-inflammatories. Please ask if you have questions about these medications, including dosage.      Body Mass Index (BMI)  Many things can cause foot and ankle problems. Foot structure, activity level, foot mechanics and injuries are common causes of pain. One very important issue that often goes unmentioned, is body weight. Extra weight can cause increased stress on muscles, ligaments, bones and tendons. Sometimes just a few extra pounds is all it takes to put one over her/his threshold. Without reducing that stress, it can be difficult to alleviate pain. Some people are uncomfortable addressing this issue, but we feel it is important for you to think about it. As Foot &  Ankle specialists, our job is addressing the lower extremity problem and possible causes. Regarding extra body weight, we encourage patients to  discuss diet and weight management plans with their primary care doctors. It is this team approach that gives you the best opportunity for pain relief and getting you back on your feet.

## 2018-08-20 NOTE — LETTER
"    8/20/2018         RE: Jimenez Ceron  [address] [place] [address]        Dear Colleague,    Thank you for referring your patient, Jimenez Ceron, to the [facility] [place]. Please see a copy of my visit note below.    Foot & Ankle Surgery  August 20, 2018    CC: \"left foot\"    I was asked to see Jimenez Ceron regarding the chief complaint by:  Dr. EFRAIN Livingston    HPI:  Pt is a 32 year old male who presents with above complaint.  Left arch/foot pain x 30 days.  Pain 5/10 \"regularly\", he has tried heating pad, soaking.  Improving last few days, \"almost gone\".  Trip to [place], now compensatory gait, walking on side of foot.  Minimal morning pain but has post-static dyskinesia    ROS:   Pos for CC.  The patient denies current nausea, vomiting, chills, fevers, belly pain, calf pain, chest pain or SOB.  Complete remainder of ROS is otherwise neg.    VITALS:    Vitals:    08/20/18 1549   Resp: 16   Weight: 165 lb (74.8 kg)   Height: 5' 9\" (1.753 m)       PMH:  No past medical history on file.    SXHX:    Past Surgical History:   Procedure Laterality Date     THORACOTOMY Right 1992    to remove empyema        MEDS:    Current Outpatient Prescriptions   Medication     Multiple Vitamins-Minerals (MULTIVITAMIN PO)     No current facility-administered medications for this visit.        ALL:   No Known Allergies    FMH:    Family History   Problem Relation Age of Onset     Diabetes Father      Hypothyroidism Brother        SocHx:    Social History     Social History     Marital status:      Spouse name: N/A     Number of children: N/A     Years of education: N/A     Occupational History     Not on file.     Social History Main Topics     Smoking status: Never Smoker     Smokeless tobacco: Never Used     Alcohol use 0.0 oz/week     0 Standard drinks or equivalent per week      Comment: occasionally     Drug use: No     Sexual activity: Yes     Partners: Female     Other Topics Concern     " Parent/Sibling W/ Cabg, Mi Or Angioplasty Before 65f 55m? No     Social History Narrative           EXAMINATION:  Gen:   No apparent distress  Neuro:   A&Ox3, no deficits  Psych:    Answering questions appropriately for age and situation with normal affect  Head:    NCAT  Eye:    Visual scanning without deficit  Ear:    Response to auditory stimuli wnl  Lung:    Non-labored breathing on RA noted  Abd:    NTND per patient report  Lymph:    Neg for pitting/non-pitting edema BLE  Vasc:    Pulses palpable, CFT minimally delayed  Neuro:    Light touch sensation intact to all sensory nerve distributions without paresthesias  Derm:    Neg for nodules, lesions or ulcerations  MSK:    Left lower extremity - points to lateral rearfoot along lateral band of plantar fascia.  No pain on rearfoot/heel exam today, however.  Calf:    Neg for redness, swelling or tenderness      Assessment:  32 year old male with plantar left arch pain       Plan:  Discussed etiologies, anatomy and options  1.  Plantar left arch pain  -comfortable shoes; minimize shoeless ambulation  -OTC insert for arch support  -RICE/NSAID prn; discussed importance of activity modifications based on pain  -consider walking cast boot    Follow up:  prn or sooner with acute issues      Patient's medical history was reviewed today    Body mass index is 24.37 kg/(m^2).          Serge Agee DPM FACFAS FACFAOM  Podiatric Foot & Ankle Surgeon  [facility]  [phone]      Again, thank you for allowing me to participate in the care of your patient.        Sincerely,        Serge Agee DPM, ROSHAN

## 2018-10-21 ENCOUNTER — NURSE TRIAGE (OUTPATIENT)
Dept: NURSING | Facility: CLINIC | Age: 33
End: 2018-10-21

## 2018-10-21 NOTE — TELEPHONE ENCOUNTER
Jimenez has a cough and a sore throat for one week.  Denies fever.  Jimenez has tried warm fluids and ibuprofen.

## 2018-10-22 ENCOUNTER — OFFICE VISIT (OUTPATIENT)
Dept: PEDIATRICS | Facility: CLINIC | Age: 33
End: 2018-10-22
Payer: COMMERCIAL

## 2018-10-22 VITALS
DIASTOLIC BLOOD PRESSURE: 60 MMHG | BODY MASS INDEX: 25.13 KG/M2 | HEIGHT: 69 IN | TEMPERATURE: 96.9 F | HEART RATE: 92 BPM | SYSTOLIC BLOOD PRESSURE: 114 MMHG | WEIGHT: 169.7 LBS | OXYGEN SATURATION: 97 %

## 2018-10-22 DIAGNOSIS — J02.9 PHARYNGITIS, UNSPECIFIED ETIOLOGY: ICD-10-CM

## 2018-10-22 DIAGNOSIS — J20.9 ACUTE BRONCHITIS, UNSPECIFIED ORGANISM: Primary | ICD-10-CM

## 2018-10-22 PROCEDURE — 99213 OFFICE O/P EST LOW 20 MIN: CPT | Performed by: FAMILY MEDICINE

## 2018-10-22 RX ORDER — PREDNISONE 20 MG/1
20 TABLET ORAL DAILY
Qty: 3 TABLET | Refills: 0 | Status: SHIPPED | OUTPATIENT
Start: 2018-10-22 | End: 2019-03-29

## 2018-10-22 RX ORDER — BENZONATATE 200 MG/1
200 CAPSULE ORAL 3 TIMES DAILY PRN
Qty: 21 CAPSULE | Refills: 1 | Status: SHIPPED | OUTPATIENT
Start: 2018-10-22 | End: 2019-03-29

## 2018-10-22 NOTE — PROGRESS NOTES
"Subjective:   Jimenez Ceron is a 33 year old male who presents for   Chief Complaint   Patient presents with     Pharyngitis      Sick 1 week ago.  Patient is for evaluation for illness that started approximately 1 week ago.  He said his wife an ear infection early last week.  No other individuals with strep throat or any respiratory infection at home.  No vomiting or diarrhea.  No sinus pressure pain.  Denies any fevers.  He is not a smoker.  Has been trying Tylenol honey and salt water as remedies.  Slept well last night using NyQuil.. he denies any history of heart problems . No shortness of breath. No body aches reported    PMHX/PSHX/MEDS/ALLERGIES/SHX/FHX reviewed in Epic.    Patient Active Problem List    Diagnosis Date Noted     Dyslipidemia 06/30/2014     Priority: Medium       Current Outpatient Prescriptions   Medication     benzonatate (TESSALON) 200 MG capsule     Multiple Vitamins-Minerals (MULTIVITAMIN PO)     predniSONE (DELTASONE) 20 MG tablet     No current facility-administered medications for this visit.      ROS:  As above per HPI    Objective:   /60 (BP Location: Right arm, Cuff Size: Adult Large)  Pulse 92  Temp 96.9  F (36.1  C) (Tympanic)  Ht 5' 9\" (1.753 m)  Wt 169 lb 11.2 oz (77 kg)  SpO2 94%  BMI 25.06 kg/m2, Body mass index is 25.06 kg/(m^2).  Gen:  NAD, well-nourished, sitting in chair comfortably  HEENT: EOMI, sclera anicteric, Head normocephalic, ; nares patent; moist mucous membranes, tonsils 1+, mallampati I/IV  Neck: trachea midline, no thyromegaly  CV:  Hemodynamically stable, RRR  Pulm:  no increased work of breathing , CTAB, no wheezes/rales/rhonchi   Extrem: no cyanosis, edema or clubbing  Skin: no obvious rashes or abnormalities  Psych: Euthymic, linear thoughts, normal rate of speech    Assessment & Plan:   Jimenez Ceron, 33 year old male who presents with:  Acute bronchitis, unspecified organism  1 week symptoms of cough and I feel he is " undertreating his cough with [place] cough drops, recommend dextromethorphan and gave paper script for tessalon if needed. F/u in 1 week if no improvement in symptoms. 97% O2 saturation recorded on the Masimo device, no increased work of breathing.   - benzonatate (TESSALON) 200 MG capsule  Dispense: 21 capsule; Refill: 1    Pharyngitis, unspecified etiology  Sore throat but no evidence of obstruction. Will recommend 3 days of low dose prednisone and also ibuprofen. Can continue with honey as needed.   - predniSONE (DELTASONE) 20 MG tablet  Dispense: 3 tablet; Refill: 0    Andi Mayberry MD   [facility]     Options for treatment and/or follow-up care were reviewed with the patient. Jimenez Ceron and/or legal guardian was engaged and actively involved in the decision making process. Patient/guardian verbalized understanding of the options discussed and was satisfied with the final plan.

## 2018-10-22 NOTE — PATIENT INSTRUCTIONS
Stay hydrated: at least 6-8 glasses of fluid/water a day    For cough: Take robitussin every 5 hours as needed  If needing more help, fill paper prescription for the Tessalon  And take every 8 hours as needed      Sore throat:   1) Prednisone 20mg once in the morning for 3 days  2) Take ibuprofen 400mg every 6 hours      If cough is not improving one week from now, return for re-evaluation

## 2018-10-22 NOTE — MR AVS SNAPSHOT
After Visit Summary   10/22/2018    Jimenez Ceron    MRN: [id number]           Patient Information     Date Of Birth          1985        Visit Information        Provider Department      10/22/2018 8:10 AM Andi Mayberry MD [facility]        Today's Diagnoses     Acute bronchitis, unspecified organism    -  1    Pharyngitis, unspecified etiology          Care Instructions    Stay hydrated: at least 6-8 glasses of fluid/water a day    For cough: Take robitussin every 5 hours as needed  If needing more help, fill paper prescription for the Tessalon  And take every 8 hours as needed      Sore throat:   1) Prednisone 20mg once in the morning for 3 days  2) Take ibuprofen 400mg every 6 hours      If cough is not improving one week from now, return for re-evaluation          Follow-ups after your visit        Who to contact     If you have questions or need follow up information about today's clinic visit or your schedule please contact [facility] directly at [phone].  Normal or non-critical lab and imaging results will be communicated to you by Gotuithart, letter or phone within 4 business days after the clinic has received the results. If you do not hear from us within 7 days, please contact the clinic through [employer] or phone. If you have a critical or abnormal lab result, we will notify you by phone as soon as possible.  Submit refill requests through [employer] or call your pharmacy and they will forward the refill request to us. Please allow 3 business days for your refill to be completed.          Additional Information About Your Visit        Gotuithart Information     [employer] gives you secure access to your electronic health record. If you see a primary care provider, you can also send messages to your care team and make appointments. If you have questions, please call your primary care clinic.  If you do not have a primary care provider, please call  "[phone] and they will assist you.        Care EveryWhere ID     This is your Care EveryWhere ID. This could be used by other organizations to access your [facility] medical records  [id number]        Your Vitals Were     Pulse Temperature Height Pulse Oximetry BMI (Body Mass Index)       92 96.9  F (36.1  C) (Tympanic) 5' 9\" (1.753 m) 94% 25.06 kg/m2        Blood Pressure from Last 3 Encounters:   10/22/18 114/60   08/10/18 108/60   02/21/18 90/56    Weight from Last 3 Encounters:   10/22/18 169 lb 11.2 oz (77 kg)   08/20/18 165 lb (74.8 kg)   08/10/18 165 lb (74.8 kg)              Today, you had the following     No orders found for display         Today's Medication Changes          These changes are accurate as of 10/22/18  8:42 AM.  If you have any questions, ask your nurse or doctor.               Start taking these medicines.        Dose/Directions    benzonatate 200 MG capsule   Commonly known as:  TESSALON   Used for:  Acute bronchitis, unspecified organism   Started by:  Andi Mayberry MD        Dose:  200 mg   Take 1 capsule (200 mg) by mouth 3 times daily as needed for cough   Quantity:  21 capsule   Refills:  1       predniSONE 20 MG tablet   Commonly known as:  DELTASONE   Used for:  Pharyngitis, unspecified etiology   Started by:  Andi Mayberry MD        Dose:  20 mg   Take 1 tablet (20 mg) by mouth daily for 3 days   Quantity:  3 tablet   Refills:  0            Where to get your medicines      These medications were sent to [id number] - [place] - [address]  AT [address] [place] [address]     Phone:  [phone]     predniSONE 20 MG tablet         Some of these will need a paper prescription and others can be bought over the counter.  Ask your nurse if you have questions.     Bring a paper prescription for each of these medications     benzonatate 200 MG capsule                Primary Care Provider Office " Phone # Fax #    Amanda Crawford [phone] [address] [place] [address]        Equal Access to Services     EVAN LUBIN : Hadii aad ku hadsamiaflorence Moe, wasarada oraqserenaha, qayaneta kaalmada jerri, mono zanein hayaajosé antonio correakaye garcía laSherrilaura espinoza. So [facility] [phone].    ATENCIÓN: Si habla español, tiene a fischer disposición servicios gratuitos de asistencia lingüística. Llame al [phone].    We comply with applicable federal civil rights laws and [place] laws. We do not discriminate on the basis of race, color, national origin, age, disability, sex, sexual orientation, or gender identity.            Thank you!     Thank you for choosing [facility]  for your care. Our goal is always to provide you with excellent care. Hearing back from our patients is one way we can continue to improve our services. Please take a few minutes to complete the written survey that you may receive in the mail after your visit with us. Thank you!             Your Updated Medication List - Protect others around you: Learn how to safely use, store and throw away your medicines at www.disposemymeds.org.          This list is accurate as of 10/22/18  8:42 AM.  Always use your most recent med list.                   Brand Name Dispense Instructions for use Diagnosis    benzonatate 200 MG capsule    TESSALON    21 capsule    Take 1 capsule (200 mg) by mouth 3 times daily as needed for cough    Acute bronchitis, unspecified organism       MULTIVITAMIN PO           predniSONE 20 MG tablet    DELTASONE    3 tablet    Take 1 tablet (20 mg) by mouth daily for 3 days    Pharyngitis, unspecified etiology

## 2018-10-22 NOTE — PROGRESS NOTES
"Subjective:   Jimenez Ceron is a 33 year old male who presents for   Chief Complaint   Patient presents with     Pharyngitis       Patient is here for evaluation for illness that started approximately 1 week ago.  He said his wife had an ear infection early last week but no other symptoms.  No other individuals with strep throat or any respiratory infection at home.  No vomiting or diarrhea.  No sinus pressure pain.  Denies any fevers.  He is not a smoker.  Has been trying Tylenol honey and salt water as remedies.  Slept well last night using NyQuil.. he denies any history of heart problems . No shortness of breath. No body aches reported    PMHX/PSHX/MEDS/ALLERGIES/SHX/FHX reviewed in Epic.    Patient Active Problem List    Diagnosis Date Noted     Dyslipidemia 06/30/2014     Priority: Medium       Current Outpatient Prescriptions   Medication     benzonatate (TESSALON) 200 MG capsule     Multiple Vitamins-Minerals (MULTIVITAMIN PO)     predniSONE (DELTASONE) 20 MG tablet     No current facility-administered medications for this visit.      ROS:  As above per HPI    Objective:   /60 (BP Location: Right arm, Cuff Size: Adult Large)  Pulse 92  Temp 96.9  F (36.1  C) (Tympanic)  Ht 5' 9\" (1.753 m)  Wt 169 lb 11.2 oz (77 kg)  SpO2 94%  BMI 25.06 kg/m2, Body mass index is 25.06 kg/(m^2).  Gen:  NAD, well-nourished, sitting in chair comfortably  HEENT: EOMI, sclera anicteric, Head normocephalic, ; nares patent; moist mucous membranes, tonsils 1+, mallampati I/IV  Neck: trachea midline, no thyromegaly  CV:  Hemodynamically stable, RRR  Pulm:  no increased work of breathing , CTAB, no wheezes/rales/rhonchi   Extrem: no cyanosis, edema or clubbing  Skin: no obvious rashes or abnormalities  Psych: Euthymic, linear thoughts, normal rate of speech    Assessment & Plan:   Jimenez Ceron, 33 year old male who presents with:  Acute bronchitis, unspecified organism  1 week symptoms of cough and I feel he " is undertreating his cough with [place] cough drops, recommend dextromethorphan and gave paper script for tessalon if needed. F/u in 1 week if no improvement in symptoms. 97% O2 saturation recorded on the Masimo device, no increased work of breathing.   - benzonatate (TESSALON) 200 MG capsule  Dispense: 21 capsule; Refill: 1    Pharyngitis, unspecified etiology  Sore throat but no evidence of obstruction. Will recommend 3 days of low dose prednisone and also ibuprofen. Can continue with honey as needed.   - predniSONE (DELTASONE) 20 MG tablet  Dispense: 3 tablet; Refill: 0    Andi Mayberry MD   [facility]     Options for treatment and/or follow-up care were reviewed with the patient. Jimenez Ceron and/or legal guardian was engaged and actively involved in the decision making process. Patient/guardian verbalized understanding of the options discussed and was satisfied with the final plan.

## 2019-03-29 ENCOUNTER — OFFICE VISIT (OUTPATIENT)
Dept: PEDIATRICS | Facility: CLINIC | Age: 34
End: 2019-03-29
Payer: COMMERCIAL

## 2019-03-29 VITALS
DIASTOLIC BLOOD PRESSURE: 72 MMHG | TEMPERATURE: 98 F | SYSTOLIC BLOOD PRESSURE: 94 MMHG | HEIGHT: 69 IN | BODY MASS INDEX: 24.93 KG/M2 | RESPIRATION RATE: 17 BRPM | HEART RATE: 86 BPM | WEIGHT: 168.3 LBS | OXYGEN SATURATION: 98 %

## 2019-03-29 DIAGNOSIS — K21.9 GASTROESOPHAGEAL REFLUX DISEASE WITHOUT ESOPHAGITIS: ICD-10-CM

## 2019-03-29 DIAGNOSIS — E55.9 VITAMIN D DEFICIENCY: ICD-10-CM

## 2019-03-29 DIAGNOSIS — Z00.00 ROUTINE HISTORY AND PHYSICAL EXAMINATION OF ADULT: Primary | ICD-10-CM

## 2019-03-29 LAB
CHOLEST SERPL-MCNC: 183 MG/DL
GLUCOSE SERPL-MCNC: 94 MG/DL (ref 70–99)
HDLC SERPL-MCNC: 32 MG/DL
LDLC SERPL CALC-MCNC: 131 MG/DL
NONHDLC SERPL-MCNC: 151 MG/DL
TRIGL SERPL-MCNC: 98 MG/DL
TSH SERPL DL<=0.005 MIU/L-ACNC: 1.62 MU/L (ref 0.4–4)

## 2019-03-29 PROCEDURE — 84443 ASSAY THYROID STIM HORMONE: CPT | Performed by: INTERNAL MEDICINE

## 2019-03-29 PROCEDURE — 36415 COLL VENOUS BLD VENIPUNCTURE: CPT | Performed by: INTERNAL MEDICINE

## 2019-03-29 PROCEDURE — 82947 ASSAY GLUCOSE BLOOD QUANT: CPT | Performed by: INTERNAL MEDICINE

## 2019-03-29 PROCEDURE — 99395 PREV VISIT EST AGE 18-39: CPT | Performed by: INTERNAL MEDICINE

## 2019-03-29 PROCEDURE — 82306 VITAMIN D 25 HYDROXY: CPT | Performed by: INTERNAL MEDICINE

## 2019-03-29 PROCEDURE — 80061 LIPID PANEL: CPT | Performed by: INTERNAL MEDICINE

## 2019-03-29 ASSESSMENT — ENCOUNTER SYMPTOMS
MUSCULOSKELETAL NEGATIVE: 1
NEUROLOGICAL NEGATIVE: 1
CARDIOVASCULAR NEGATIVE: 1
PSYCHIATRIC NEGATIVE: 1
GASTROINTESTINAL NEGATIVE: 1
HEMATOLOGIC/LYMPHATIC NEGATIVE: 1
EYES NEGATIVE: 1
RESPIRATORY NEGATIVE: 1
CONSTITUTIONAL NEGATIVE: 1

## 2019-03-29 ASSESSMENT — MIFFLIN-ST. JEOR: SCORE: 1688.78

## 2019-03-29 NOTE — PROGRESS NOTES
SUBJECTIVE:   CC: Jimenez Ceron is an 33 year old male who presents for preventative health visit.     Physical   Annual:     Getting at least 3 servings of Calcium per day:  Yes    Bi-annual eye exam:  Yes    Dental care twice a year:  NO    Sleep apnea or symptoms of sleep apnea:  None    Diet:  Regular (no restrictions)    Frequency of exercise:  4-5 days/week    Duration of exercise:  30-45 minutes    Taking medications regularly:  Not Applicable    Additional concerns today:  No    PHQ-2 Total Score: 2    Occasional GERD with orange juice.      Today's PHQ-2 Score:   PHQ-2 ( 1999 Pfizer) 3/28/2019   Q1: Little interest or pleasure in doing things 2   Q2: Feeling down, depressed or hopeless 0   PHQ-2 Score 2   Q1: Little interest or pleasure in doing things More than half the days   Q2: Feeling down, depressed or hopeless Not at all   PHQ-2 Score 2         Abuse: Current or Past(Physical, Sexual or Emotional)- No  Do you feel safe in your environment? Yes    Social History     Tobacco Use     Smoking status: Never Smoker     Smokeless tobacco: Never Used   Substance Use Topics     Alcohol use: Yes     Alcohol/week: 0.0 oz     Comment: occasionally     Alcohol Use 3/28/2019   If you drink alcohol do you typically have greater than 3 drinks per day OR greater than 7 drinks per week? No       Last PSA: No results found for: PSA    Reviewed orders with patient. Reviewed health maintenance and updated orders accordingly - Yes  Labs reviewed in EPIC    Reviewed and updated as needed this visit by clinical staff         Reviewed and updated as needed this visit by Provider            Review of Systems   Constitutional: Negative.    HENT: Negative.    Eyes: Negative.    Respiratory: Negative.    Cardiovascular: Negative.    Gastrointestinal: Negative.    Genitourinary: Negative.    Musculoskeletal: Negative.    Skin: Negative.    Neurological: Negative.    Hematological: Negative.    Psychiatric/Behavioral:  "Negative.    All other systems reviewed and are negative.      OBJECTIVE:   BP 94/72 (BP Location: Right arm, Patient Position: Sitting, Cuff Size: Adult Regular)   Pulse 86   Temp 98  F (36.7  C) (Tympanic)   Resp 17   Ht 1.753 m (5' 9\")   Wt 76.3 kg (168 lb 4.8 oz)   SpO2 98%   BMI 24.85 kg/m      Physical Exam  GENERAL: healthy, alert and no distress  EYES: Eyes grossly normal to inspection, PERRL and conjunctivae and sclerae normal  HENT: ear canals and TM's normal, nose and mouth without ulcers or lesions  NECK: no adenopathy, no asymmetry, masses, or scars and thyroid normal to palpation  RESP: lungs clear to auscultation - no rales, rhonchi or wheezes  CV: regular rate and rhythm, normal S1 S2, no S3 or S4, no murmur, click or rub, no peripheral edema and peripheral pulses strong  ABDOMEN: soft, nontender, no hepatosplenomegaly, no masses and bowel sounds normal  MS: no gross musculoskeletal defects noted, no edema  SKIN: no suspicious lesions or rashes  NEURO: Normal strength and tone, mentation intact and speech normal  PSYCH: mentation appears normal, affect normal/bright    Diagnostic Test Results:  Results for orders placed or performed in visit on 03/29/19   Lipid panel reflex to direct LDL Fasting   Result Value Ref Range    Cholesterol 183 <200 mg/dL    Triglycerides 98 <150 mg/dL    HDL Cholesterol 32 (L) >39 mg/dL    LDL Cholesterol Calculated 131 (H) <100 mg/dL    Non HDL Cholesterol 151 (H) <130 mg/dL   Glucose   Result Value Ref Range    Glucose 94 70 - 99 mg/dL   TSH with free T4 reflex   Result Value Ref Range    TSH 1.62 0.40 - 4.00 mU/L       ASSESSMENT/PLAN:       ICD-10-CM    1. Routine history and physical examination of adult Z00.00 Lipid panel reflex to direct LDL Fasting     Glucose     Vitamin D Deficiency     TSH with free T4 reflex   2. Gastroesophageal reflux disease without esophagitis K21.9 H Pylori antigen, stool       COUNSELING:   Reviewed preventive health counseling, " "as reflected in patient instructions    BP Readings from Last 1 Encounters:   10/22/18 114/60     Estimated body mass index is 25.06 kg/m  as calculated from the following:    Height as of 10/22/18: 1.753 m (5' 9\").    Weight as of 10/22/18: 77 kg (169 lb 11.2 oz).           reports that  has never smoked. he has never used smokeless tobacco.      Counseling Resources:  ATP IV Guidelines  Pooled Cohorts Equation Calculator  FRAX Risk Assessment  ICSI Preventive Guidelines  Dietary Guidelines for Americans, 2010  USDA's MyPlate  ASA Prophylaxis  Lung CA Screening    Josue aT MD  [facility] [place]  "

## 2019-03-29 NOTE — PATIENT INSTRUCTIONS
1) Stop at lab downstairs today. If you have Mychart, your results will be sent this way. If you do not have Mychart, you can sign up by following the link in this after visit summary.    2) We can check the stool for infection called Helicobacter pylori to see if issues that need to be treated.    Josue Ta MD    Preventive Health Recommendations  Male Ages 26 - 39    Yearly exam:             See your health care provider every year in order to  o   Review health changes.   o   Discuss preventive care.    o   Review your medicines if your doctor has prescribed any.    You should be tested each year for STDs (sexually transmitted diseases), if you re at risk.     After age 35, talk to your provider about cholesterol testing. If you are at risk for heart disease, have your cholesterol tested at least every 5 years.     If you are at risk for diabetes, you should have a diabetes test (fasting glucose).  Shots: Get a flu shot each year. Get a tetanus shot every 10 years.     Nutrition:    Eat at least 5 servings of fruits and vegetables daily.     Eat whole-grain bread, whole-wheat pasta and brown rice instead of white grains and rice.     Get adequate Calcium and Vitamin D.     Lifestyle    Exercise for at least 150 minutes a week (30 minutes a day, 5 days a week). This will help you control your weight and prevent disease.     Limit alcohol to one drink per day.     No smoking.     Wear sunscreen to prevent skin cancer.     See your dentist every six months for an exam and cleaning.

## 2019-03-30 LAB — DEPRECATED CALCIDIOL+CALCIFEROL SERPL-MC: 17 UG/L (ref 20–75)

## 2019-04-01 RX ORDER — ERGOCALCIFEROL 1.25 MG/1
50000 CAPSULE, LIQUID FILLED ORAL WEEKLY
Qty: 8 CAPSULE | Refills: 1 | Status: SHIPPED | OUTPATIENT
Start: 2019-04-01 | End: 2019-05-21

## 2019-04-03 DIAGNOSIS — K21.9 GASTROESOPHAGEAL REFLUX DISEASE WITHOUT ESOPHAGITIS: ICD-10-CM

## 2019-04-03 PROCEDURE — 87338 HPYLORI STOOL AG IA: CPT | Performed by: INTERNAL MEDICINE

## 2019-04-04 LAB
H PYLORI AG STL QL IA: NORMAL
SPECIMEN SOURCE: NORMAL

## 2020-03-10 ENCOUNTER — HEALTH MAINTENANCE LETTER (OUTPATIENT)
Age: 35
End: 2020-03-10

## 2020-04-02 ENCOUNTER — OFFICE VISIT (OUTPATIENT)
Dept: URGENT CARE | Facility: URGENT CARE | Age: 35
End: 2020-04-02
Payer: COMMERCIAL

## 2020-04-02 ENCOUNTER — VIRTUAL VISIT (OUTPATIENT)
Dept: FAMILY MEDICINE | Facility: OTHER | Age: 35
End: 2020-04-02

## 2020-04-02 VITALS — OXYGEN SATURATION: 99 % | TEMPERATURE: 97.6 F | HEART RATE: 94 BPM

## 2020-04-02 DIAGNOSIS — Z20.822 SUSPECTED COVID-19 VIRUS INFECTION: ICD-10-CM

## 2020-04-02 DIAGNOSIS — R06.02 SOB (SHORTNESS OF BREATH): Primary | ICD-10-CM

## 2020-04-02 PROCEDURE — 99213 OFFICE O/P EST LOW 20 MIN: CPT | Performed by: FAMILY MEDICINE

## 2020-04-02 NOTE — PROGRESS NOTES
"Date: 2020 15:22:23  Clinician: Florencia Shafer  Clinician NPI: [id number]  Patient: Jimenez Ceron  Patient : 1985  Patient Address: [facility] [place] [address]  Patient Phone: [phone]  Visit Protocol: URI  Patient Summary:  Jimenez is a 34 year old ( : 1985 ) male who initiated a Visit for COVID-19 (Coronavirus) evaluation and screening. When asked the question \"Please sign me up to receive news, health information and promotions. \", Jimenez responded \"Yes\".    Jimenez states his symptoms started 1-2 days ago.   Jimenez denies having teeth pain, headache, fever, facial pain or pressure, myalgias, chills, wheezing, sore throat, cough, nasal congestion, malaise, ear pain, and rhinitis. He also denies taking antibiotic medication for the symptoms and having recent facial or sinus surgery in the past 60 days. He is not experiencing dyspnea.    Pertinent COVID-19 (Coronavirus) information  Jimenez has not traveled internationally or to the areas where COVID-19 (Coronavirus) is widespread, including cruise ship travel in the last 14 days before the start of his symptoms.   Jimenez has not had a close contact with a laboratory-confirmed COVID-19 patient within 14 days of symptom onset. He also has not had a close contact with a suspected COVID-19 patient within 14 days of symptom onset.   Jimenez is not a healthcare worker or a  and does not work in a healthcare facility. He does not live with a healthcare worker.   Pertinent medical history  Jimenez does not need a return to work/school note.   Weight: 166 lbs   Jimenez smokes or uses smokeless tobacco.   Additional information as reported by the patient (free text): sometimes chest pain..   Weight: 166 lbs    MEDICATIONS: No current medications, ALLERGIES: NKDA  Clinician Response:  Dear Jimenez,   Based on the information you have provided, further evaluation in urgent care is indicated. You may be " seen in one of our designated urgent care sites that is prepared to see patients who have symptoms that could indicate Coronavirus (Covid-19).   For your information: At this time we will NOT perform coronavirus testing in urgent care sites. This test is currently being reserved for patients who are being admitted to the hospital.  [facility] Locations: [place], [place], [place], [place], [place], [facility], [facility]), [place] and [place]  Please call [address] [place] [address] ([phone]) to schedule an urgent care appointment at one of our urgent care or walk in care sites. It is essential that you tell them when you call that you were referred to be scheduled for in-person urgent care appointment by a provider in [facility].      [facility]: If you usually get care or are located in the [facility] area, you can be seen as a walk in without an appointment at the [facility]. This is open from 8AM-8PM on weekdays and 10am-8pm on weekends. The phone number to this clinic is [phone].     PLEASE BRING DOCUMENTATION FROM THIS COMPLETED [facility] VISIT TO YOUR URGENT CARE VISIT.     For more information about COVID19 and options for caring for yourself at home, please visit the CDC website at https://www.cdc.gov/coronavirus/2019-ncov/about/steps-when-sick.html  For more options for care at [facility], please visit our website at [email or web address]    Diagnosis: Cough  Diagnosis ICD: R05

## 2020-04-02 NOTE — PROGRESS NOTES
SUBJECTIVE:   Jimenez Ceron is a 34 year old male presenting with a chief complaint of chest pain and taste bud change    Patient states that symptoms started 2 days ago, had intermittent chest pain and feels better when takes a big breath.  Denies any fever.  Started to notice taste bud changed.  Denies any sore throat, congestion, rash, abdominal pain.    Patient denies history of asthma.    No past medical history on file.  Current Outpatient Medications   Medication Sig Dispense Refill     Multiple Vitamins-Minerals (MULTIVITAMIN PO)        Social History     Tobacco Use     Smoking status: Never Smoker     Smokeless tobacco: Never Used   Substance Use Topics     Alcohol use: Yes     Alcohol/week: 0.0 standard drinks     Comment: occasionally       ROS:  Review of systems negative except as stated above.    OBJECTIVE:  Pulse 94   Temp 97.6  F (36.4  C) (Tympanic)   SpO2 99%   GENERAL APPEARANCE: healthy, alert and no distress  EYES: EOMI,  PERRL, conjunctiva clear  HENT: ear canals and TM's normal.  Nose and mouth without ulcers, erythema or lesions  RESP: lungs clear to auscultation - no rales, rhonchi or wheezes  CV: regular rates and rhythm, normal S1 S2, no murmur noted  PSYCH: mentation appears normal and affect normal/bright    ASSESSMENT/PLAN:  (R06.02) SOB (shortness of breath)  (primary encounter diagnosis)  Plan: symptomatic treatment    (R68.89) Suspected Covid-19 Virus Infection  Plan: symptomatic treatment      Reassurance given, reviewed symptomatic treatment with tylenol, plenty of fluids and rest.  Discussed that most likely has COVID, mild symptoms.  Reviewed importance of self quarantine.      Follow up with primary provider if no improvement of symptoms in 1 week    Jeet Mchugh MD, MD  April 2, 2020 5:11 PM

## 2020-04-02 NOTE — PATIENT INSTRUCTIONS
Patient Education     Understanding Coronavirus Disease 2019 (COVID-19)  Coronavirus disease 2019 (COVID-19) is a respiratory illness. It's caused by a new (novel) coronavirus called SARS-CoV-2. There are many types of coronavirus. Coronaviruses are a very common cause of bronchitis. They may sometimes cause lung infection (pneumonia). Symptoms can range from mild to severe respiratory illness. These viruses are also found in some animals. COVID-19 was first found in people in [facility], in late 2019. In 2020, several cases of COVID-19 have been confirmed in the [place] COVID-19 is a rapidly- emerging infectious disease. This means that scientists are actively researching it. There are information updates regularly.  Public health officials are working to find the source. How the virus spreads is not yet fully understood, but it seems to spread and infect people fairly easily. Some people who have been infected in an area may be unsure how or where they became infected. The virus may be spread through droplets of fluid that a person coughs or sneezes into the air. It may be spread if you touch a surface with virus on it, such as a handle or object, and then touch your eyes, nose, or mouth.  For the latest information, visit the CDC website at www.cdc.gov/coronavirus/2019-ncov. Or call 775-UUF-JNDQ ([phone]).   What are the symptoms of COVID-19?  Some people have no symptoms or mild symptoms. Symptoms may appear 2 to 14 days after contact with the virus. Symptoms can include:    Fever    Coughing    Trouble breathing  What are possible complications from COVID-19?  In many cases, this virus can cause infection (pneumonia) in both lungs. In some cases, this can cause death. Certain people are at higher risk for complications. This includes older adults and people with serious chronic health conditions such as heart or lung disease or diabetes.  How is COVID-19 diagnosed?  Your healthcare provider will ask about  your symptoms. He or she will also ask about your recent travel and contact with sick people. If your healthcare provider thinks you may have COVID-19, he or she will work closely with your local health department on testing. Follow all instructions from your healthcare provider. COVID-19 is diagnosed by:    Nose and throat swab. A cotton-tipped swab is wiped inside your nose or throat. This is done to check for viruses in your nasal mucus.    Sputum culture. A small sample of mucus coughed from your lungs (sputum) is collected if you have a cough. It's checked for the virus.  How is COVID-19 treated?  There is currently no medicine to treat the virus. Treatment is done to help your body while it fights the virus. This is known as supportive care. Supportive care may include:    Pain medicine. These include acetaminophen and ibuprofen. They are used to help ease pain and reduce fever.    Bed rest. This helps your body fight the illness.  For severe illness, you may need to stay in the hospital. Care during severe illness may include:    IV (intravenous) fluids. These are given through a vein to help keep your body hydrated.    Oxygen. Supplemental oxygen or ventilation with a breathing machine (ventilator) may be given. This is done so you get enough oxygen in your body.  Are you at risk for COVID-19?  You are at risk for infection if you ve been to a place where people have been sick with this virus or if there are people with COVID-19 in your area. You are at risk if you:    Recently traveled to an area with a COVID-19 outbreak    Had contact with a sick person who recently traveled to an area with a COVID-19 outbreak    Had contact with a person who was diagnosed with or who may have COVID-19     How can COVID-19 be prevented?  There is no vaccine yet. The best prevention is to not have contact with the virus. The CDC advises that people should not travel to areas where there are COVID-19 outbreaks right now for  "any reason that is not urgent. For the most current CDC travel advisories, visit the CDC website at www.cdc.gov/coronavirus/2019-ncov/travelers.     To help prevent spreading the infection, wash your hands often, or use an alcohol-based hand .     The CDC advises that you should not wear a facemask if you are not sick.  Prepare and protect yourself from COVID-19:     Wash your hands often with soap and clean, running water for at least 20 seconds.    If you don't have access to soap and water, use an alcohol-based hand  often. Make sure it has at least 60% alcohol.    Don't touch your eyes, nose, or mouth unless you have clean hands.    As much as possible, don't touch \"high-touch\" public surfaces such as doorknobs. Don't shake hands.    Clean home and work surfaces often with disinfectant.    Cough or sneeze into a tissue, then throw the tissue into the trash. If you don't have tissues, cough or sneeze into the bend of your elbow.    Stay informed about COVID-19 in your area. Follow local instructions about being in public. Be aware of events in your community that may be postponed or canceled such as school and sporting events. You may be advised not to attend public gatherings. You will be advised to stay about 6 feet from others as much as possible. This is called \"social distancing.\"    Check your home supplies. Consider keeping a 2-week supply of medicines, food, and other needed household items.    Make a plan for childcare, work, and ways to stay in touch with others. Know who will help you if you get sick.    Don't be around people who are sick.    There is no evidence right now that animals spread SARS-CoV-2. But it's always a good idea to wash your hands after touching any animals. Don't touch animals that may be sick.    Don t share eating or drinking utensils with sick people.    Don t kiss someone who is sick.  If you were in an area with COVID-19 in the last 14 days:     Call your " healthcare provider and follow all instructions. Your activities and where you go may be restricted for up to 2 weeks.    Take your temperature every morning and evening for at least 14 days. This is to check for fever. Keep a record of the readings.    Watch for symptoms of the virus. Call your provider if you have symptoms. Call your provider first before going to any clinic or hospital.    Stay home if you are sick for any reason.  If you are sick with COVID-19 symptoms:     Stay home. Call your healthcare provider and tell them you have symptoms of COVID-19. Do this before going to any hospital or clinic. Follow your provider's instructions. You may be advised to isolate yourself at home. This is called self-isolation or self-quarantine.    Don t panic. Keep in mind that other illnesses can cause similar symptoms.    Stay away from work, school, and public places. Limit physical contact with family members. Limit visitors. Don't kiss anyone or share eating or drinking utensils. Clean surfaces you touch with disinfectant. This is to help prevent the virus from spreading.    Cough or sneeze into a tissue, then throw away the tissue in the trash. If you don't have tissues, cough or sneeze into the bend of your elbow.    Wear a facemask only if you have symptoms    If you need to go in to a hospital or clinic, expect that the healthcare staff will wear protective equipment such as masks, gowns, gloves, and eye protection. You may be put in a separate room. This is to prevent the possible virus from spreading.    Tell the healthcare staff about recent travel. This includes local travel on public transport. Staff may need to find other people you have been in contact with.    Follow all instructions the healthcare staff give you.  If you have been diagnosed with COVID-19    Stay home. Don t leave your home unless you need to get medical care. Don't go to work, school, or public areas. Don't use public transportation  or taxis.    Follow all instructions from your healthcare provider. Call your healthcare provider s office before going. They can prepare and give you instructions. This will help prevent the virus from spreading.    If you need to go to a hospital or clinic, expect that the healthcare staff will wear protective equipment such as masks, gowns, gloves, and eye protection. You may be put in a separate room. This is to prevent the possible virus from spreading.    Wear a face mask. This is to protect other people from your germs. If you are not able to wear a mask, your caregivers should.    Stay away from other people in your home.    Limit contact with pets and animals. Although there are no reports of pets getting sick with COVID-19, consider limiting contact with pets until more is known.    Don t share household items or food.    Cover your face with a tissue when you cough or sneeze. Throw the tissue away. Then wash your hands.    Wash your hands often.  If you are caring for a sick person:    Follow all instructions from healthcare staff.    Wash your hands often.    Wear protective clothing as advised.    Make sure the sick person wears a mask. If they can't wear a mask, don't stay in the same room with the person. If you must be in the same room, wear a facemask.    Keep track of the sick person s symptoms.    Clean surfaces, fabrics, and laundry thoroughly.    Keep other people and pets away from the sick person.  When to call your healthcare provider  Call your healthcare provider:    If you ve recently traveled or have been in an area with COVID-19 and have symptoms    If you have been diagnosed with COVID-19 and your symptoms are worse   Martine last reviewed this educational content on 1/1/2020  English footer  Thai footer    6446-3294 [email or web address]. [address] [place] [address], [place] [address]. All rights reserved. This information is not intended as a substitute for professional medical  care. Always follow your healthcare professional's instructions.

## 2020-08-03 ENCOUNTER — OFFICE VISIT (OUTPATIENT)
Dept: PEDIATRICS | Facility: CLINIC | Age: 35
End: 2020-08-03
Payer: COMMERCIAL

## 2020-08-03 ENCOUNTER — NURSE TRIAGE (OUTPATIENT)
Dept: PEDIATRICS | Facility: CLINIC | Age: 35
End: 2020-08-03

## 2020-08-03 ENCOUNTER — ANCILLARY PROCEDURE (OUTPATIENT)
Dept: GENERAL RADIOLOGY | Facility: CLINIC | Age: 35
End: 2020-08-03
Attending: INTERNAL MEDICINE
Payer: COMMERCIAL

## 2020-08-03 VITALS
HEART RATE: 69 BPM | BODY MASS INDEX: 25.18 KG/M2 | HEIGHT: 69 IN | DIASTOLIC BLOOD PRESSURE: 65 MMHG | SYSTOLIC BLOOD PRESSURE: 102 MMHG | WEIGHT: 170 LBS | TEMPERATURE: 98.1 F | RESPIRATION RATE: 16 BRPM

## 2020-08-03 DIAGNOSIS — S86.911A STRAIN OF RIGHT KNEE, INITIAL ENCOUNTER: Primary | ICD-10-CM

## 2020-08-03 DIAGNOSIS — M25.561 RIGHT KNEE PAIN, UNSPECIFIED CHRONICITY: ICD-10-CM

## 2020-08-03 PROCEDURE — 73560 X-RAY EXAM OF KNEE 1 OR 2: CPT | Mod: RT

## 2020-08-03 PROCEDURE — 99213 OFFICE O/P EST LOW 20 MIN: CPT | Performed by: INTERNAL MEDICINE

## 2020-08-03 ASSESSMENT — MIFFLIN-ST. JEOR: SCORE: 1691.49

## 2020-08-03 NOTE — TELEPHONE ENCOUNTER
"S-(situation): Right knee pain >1 week.     B-(background): 1 week ago was lifting his children up on his legs and felt a pull and heard something inside knee pop.     A-(assessment): See below.     R-(recommendations): Same day appointment in clinic today at 2:25pm.       Patient expressed understanding and acceptance of the plan and had no further questions at this time. Advised to call back if worsening symptoms or no improvement noted. Also advised can call back to clinic at any time with concerns.         Additional Information    MODERATE pain (e.g., symptoms interfere with work or school, limping) and present > 3 days    Answer Assessment - Initial Assessment Questions  1. LOCATION and RADIATION: \"Where is the pain located?\"   Right knee. The inside of knee.   2. QUALITY: \"What does the pain feel like?\"  (e.g., sharp, dull, aching, burning)  Not sharp. More of an aching pain.   3. SEVERITY: \"How bad is the pain?\" \"What does it keep you from doing?\"   (Scale 1-10; or mild, moderate, severe)    -  MILD (1-3): doesn't interfere with normal activities.   I feel a lot pulling at times.   4. ONSET: \"When did the pain start?\" \"Does it come and go, or is it there all the time?\"      1 week. Comes and go.  5. RECURRENT: \"Have you had this pain before?\" If so, ask: \"When, and what happened then?\"     No.   6. SETTING: \"Has there been any recent work, exercise or other activity that involved that part of the body?\"       Yes lifting the kids up on the legs I felt a pain.   7. AGGRAVATING FACTORS: \"What makes the knee pain worse?\" (e.g., walking, climbing stairs, running)      NO   8. ASSOCIATED SYMPTOMS: \"Is there any swelling or redness of the knee?\"      No   9. OTHER SYMPTOMS: \"Do you have any other symptoms?\" (e.g., chest pain, difficulty breathing, fever, calf pain)      No   10. PREGNANCY: \"Is there any chance you are pregnant?\" \"When was your last menstrual period?\"  Na    Patient would like an " x-ray.    Protocols used: KNEE PAIN-A-OH      Tasneem Boone RN Flex

## 2020-08-03 NOTE — TELEPHONE ENCOUNTER
Symptoms  Describe your symptoms: Knee pain, patient stated that when he rubs the side of the knee it is painful and almost feels like nerve pain.  Any pain: Yes: left knee  How long have you been having symptoms: 1  weeks  Have you been seen for this:  No  Appointment offered?: Yes: wanted to speak to a nurse first  Triage offered?: Yes: encounter  Home remedies tried: n/a  Requested Pharmacy: n/a  Okay to leave a detailed message? Yes at Cell number on file:    Telephone Information:   Mobile [phone]       Erika Morelos,

## 2020-08-03 NOTE — PROGRESS NOTES
"Subjective     Jimenez Ceron is a 34 year old male who presents to clinic today for the following health issues:    HPI       Musculoskeletal problem/pain      Duration: week    Description  Location: right knee.  Started after bouncing his young children on his leg for a several minutes        occ pain around knee.  Walks, pedals without difficulty    Intensity:  moderate    Accompanying signs and symptoms: none    History  Previous similar problem: no   Previous evaluation:  none    Precipitating or alleviating factors:  Trauma or overuse: YES- playing with his kids  Aggravating factors include: walking    Therapies tried and outcome: nothing    Patient Active Problem List   Diagnosis     Dyslipidemia     Past Surgical History:   Procedure Laterality Date     THORACOTOMY Right 1992    to remove empyema       Social History     Tobacco Use     Smoking status: Never Smoker     Smokeless tobacco: Never Used   Substance Use Topics     Alcohol use: Yes     Alcohol/week: 0.0 standard drinks     Comment: occasionally     Family History   Problem Relation Age of Onset     Diabetes Father      Hypothyroidism Brother      Colon Cancer Paternal Grandmother          Current Outpatient Medications   Medication Sig Dispense Refill     Multiple Vitamins-Minerals (MULTIVITAMIN PO)          Reviewed and updated as needed this visit by Provider         Review of Systems   Constitutional, HEENT, cardiovascular, pulmonary, gi and gu systems are negative, except as otherwise noted.      Objective    /65 (Cuff Size: Adult Regular)   Pulse 69   Temp 98.1  F (36.7  C) (Tympanic)   Resp 16   Ht 1.753 m (5' 9\")   Wt 77.1 kg (170 lb)   BMI 25.10 kg/m    Body mass index is 25.1 kg/m .  Physical Exam   GENERAL: healthy, alert and no distress  Knee exam: normal exam, no swelling, tenderness, instability; ligaments intact, FROM.        collateral ligaments intact, negative Jordan sign, negative Lachmann sign, patellar " tenderness absent.         Assessment & Plan       ICD-10-CM    1. Strain of right knee, initial encounter  S86.911A    2. Right knee pain, unspecified chronicity  M25.561 XR Knee Right 1/2 Views     Plain films right knee-normal  Stable knee exam.  Likely mild strain  rec rest and activity as tolerated for next 2 weeks  Offered physical therapy which pt declines  Pt will follow-up if sx fail to improve     Clarence Morales MD  [facility]

## 2020-11-24 ENCOUNTER — ALLIED HEALTH/NURSE VISIT (OUTPATIENT)
Dept: PEDIATRICS | Facility: CLINIC | Age: 35
End: 2020-11-24
Payer: COMMERCIAL

## 2020-11-24 DIAGNOSIS — Z23 NEED FOR PROPHYLACTIC VACCINATION AND INOCULATION AGAINST INFLUENZA: Primary | ICD-10-CM

## 2020-11-24 PROCEDURE — 90686 IIV4 VACC NO PRSV 0.5 ML IM: CPT

## 2020-11-24 PROCEDURE — 99207 PR NO CHARGE NURSE ONLY: CPT

## 2020-11-24 PROCEDURE — 90471 IMMUNIZATION ADMIN: CPT

## 2020-12-27 ENCOUNTER — HEALTH MAINTENANCE LETTER (OUTPATIENT)
Age: 35
End: 2020-12-27

## 2021-10-04 ENCOUNTER — HEALTH MAINTENANCE LETTER (OUTPATIENT)
Age: 36
End: 2021-10-04

## 2022-01-23 ENCOUNTER — HEALTH MAINTENANCE LETTER (OUTPATIENT)
Age: 37
End: 2022-01-23

## 2022-03-03 ENCOUNTER — OFFICE VISIT (OUTPATIENT)
Dept: PEDIATRICS | Facility: CLINIC | Age: 37
End: 2022-03-03
Payer: COMMERCIAL

## 2022-03-03 VITALS
SYSTOLIC BLOOD PRESSURE: 110 MMHG | RESPIRATION RATE: 12 BRPM | BODY MASS INDEX: 25.67 KG/M2 | OXYGEN SATURATION: 99 % | HEART RATE: 73 BPM | WEIGHT: 173.3 LBS | DIASTOLIC BLOOD PRESSURE: 66 MMHG | TEMPERATURE: 97.9 F | HEIGHT: 69 IN

## 2022-03-03 DIAGNOSIS — Z11.59 NEED FOR HEPATITIS C SCREENING TEST: ICD-10-CM

## 2022-03-03 DIAGNOSIS — E55.9 VITAMIN D DEFICIENCY: ICD-10-CM

## 2022-03-03 DIAGNOSIS — Z00.00 ROUTINE HISTORY AND PHYSICAL EXAMINATION OF ADULT: Primary | ICD-10-CM

## 2022-03-03 DIAGNOSIS — Z11.4 SCREENING FOR HIV (HUMAN IMMUNODEFICIENCY VIRUS): ICD-10-CM

## 2022-03-03 LAB
ALBUMIN SERPL-MCNC: 4 G/DL (ref 3.4–5)
ALP SERPL-CCNC: 102 U/L (ref 40–150)
ALT SERPL W P-5'-P-CCNC: 31 U/L (ref 0–70)
ANION GAP SERPL CALCULATED.3IONS-SCNC: 7 MMOL/L (ref 3–14)
AST SERPL W P-5'-P-CCNC: 19 U/L (ref 0–45)
BILIRUB SERPL-MCNC: 0.4 MG/DL (ref 0.2–1.3)
BUN SERPL-MCNC: 11 MG/DL (ref 7–30)
CALCIUM SERPL-MCNC: 8.7 MG/DL (ref 8.5–10.1)
CHLORIDE BLD-SCNC: 106 MMOL/L (ref 94–109)
CO2 SERPL-SCNC: 25 MMOL/L (ref 20–32)
CREAT SERPL-MCNC: 1.06 MG/DL (ref 0.66–1.25)
GFR SERPL CREATININE-BSD FRML MDRD: >90 ML/MIN/1.73M2
GLUCOSE BLD-MCNC: 87 MG/DL (ref 70–99)
POTASSIUM BLD-SCNC: 3.7 MMOL/L (ref 3.4–5.3)
PROT SERPL-MCNC: 7.8 G/DL (ref 6.8–8.8)
SODIUM SERPL-SCNC: 138 MMOL/L (ref 133–144)

## 2022-03-03 PROCEDURE — 99212 OFFICE O/P EST SF 10 MIN: CPT | Mod: 25 | Performed by: PEDIATRICS

## 2022-03-03 PROCEDURE — 99395 PREV VISIT EST AGE 18-39: CPT | Performed by: PEDIATRICS

## 2022-03-03 PROCEDURE — 82306 VITAMIN D 25 HYDROXY: CPT | Performed by: PEDIATRICS

## 2022-03-03 PROCEDURE — 36415 COLL VENOUS BLD VENIPUNCTURE: CPT | Performed by: PEDIATRICS

## 2022-03-03 PROCEDURE — 87389 HIV-1 AG W/HIV-1&-2 AB AG IA: CPT | Performed by: PEDIATRICS

## 2022-03-03 PROCEDURE — 80053 COMPREHEN METABOLIC PANEL: CPT | Performed by: PEDIATRICS

## 2022-03-03 PROCEDURE — 86803 HEPATITIS C AB TEST: CPT | Performed by: PEDIATRICS

## 2022-03-03 PROCEDURE — 80061 LIPID PANEL: CPT | Performed by: PEDIATRICS

## 2022-03-03 PROCEDURE — 96127 BRIEF EMOTIONAL/BEHAV ASSMT: CPT | Performed by: PEDIATRICS

## 2022-03-03 SDOH — ECONOMIC STABILITY: FOOD INSECURITY: WITHIN THE PAST 12 MONTHS, THE FOOD YOU BOUGHT JUST DIDN'T LAST AND YOU DIDN'T HAVE MONEY TO GET MORE.: NEVER TRUE

## 2022-03-03 SDOH — HEALTH STABILITY: PHYSICAL HEALTH: ON AVERAGE, HOW MANY DAYS PER WEEK DO YOU ENGAGE IN MODERATE TO STRENUOUS EXERCISE (LIKE A BRISK WALK)?: 2 DAYS

## 2022-03-03 SDOH — ECONOMIC STABILITY: INCOME INSECURITY: HOW HARD IS IT FOR YOU TO PAY FOR THE VERY BASICS LIKE FOOD, HOUSING, MEDICAL CARE, AND HEATING?: NOT HARD AT ALL

## 2022-03-03 SDOH — ECONOMIC STABILITY: TRANSPORTATION INSECURITY
IN THE PAST 12 MONTHS, HAS THE LACK OF TRANSPORTATION KEPT YOU FROM MEDICAL APPOINTMENTS OR FROM GETTING MEDICATIONS?: NO

## 2022-03-03 SDOH — HEALTH STABILITY: PHYSICAL HEALTH: ON AVERAGE, HOW MANY MINUTES DO YOU ENGAGE IN EXERCISE AT THIS LEVEL?: 20 MIN

## 2022-03-03 SDOH — ECONOMIC STABILITY: TRANSPORTATION INSECURITY
IN THE PAST 12 MONTHS, HAS LACK OF TRANSPORTATION KEPT YOU FROM MEETINGS, WORK, OR FROM GETTING THINGS NEEDED FOR DAILY LIVING?: NO

## 2022-03-03 SDOH — ECONOMIC STABILITY: FOOD INSECURITY: WITHIN THE PAST 12 MONTHS, YOU WORRIED THAT YOUR FOOD WOULD RUN OUT BEFORE YOU GOT MONEY TO BUY MORE.: NEVER TRUE

## 2022-03-03 SDOH — ECONOMIC STABILITY: INCOME INSECURITY: IN THE LAST 12 MONTHS, WAS THERE A TIME WHEN YOU WERE NOT ABLE TO PAY THE MORTGAGE OR RENT ON TIME?: NO

## 2022-03-03 ASSESSMENT — SOCIAL DETERMINANTS OF HEALTH (SDOH)
HOW OFTEN DO YOU ATTEND CHURCH OR RELIGIOUS SERVICES?: 1 TO 4 TIMES PER YEAR
HOW OFTEN DO YOU GET TOGETHER WITH FRIENDS OR RELATIVES?: ONCE A WEEK
IN A TYPICAL WEEK, HOW MANY TIMES DO YOU TALK ON THE PHONE WITH FAMILY, FRIENDS, OR NEIGHBORS?: MORE THAN THREE TIMES A WEEK
DO YOU BELONG TO ANY CLUBS OR ORGANIZATIONS SUCH AS CHURCH GROUPS UNIONS, FRATERNAL OR ATHLETIC GROUPS, OR SCHOOL GROUPS?: NO

## 2022-03-03 ASSESSMENT — ENCOUNTER SYMPTOMS
HEMATOCHEZIA: 0
JOINT SWELLING: 0
PARESTHESIAS: 0
ARTHRALGIAS: 0
PALPITATIONS: 0
EYE PAIN: 0
NERVOUS/ANXIOUS: 0
HEADACHES: 0
SORE THROAT: 0
MYALGIAS: 0
FEVER: 0
NAUSEA: 0
DIARRHEA: 0
HEMATURIA: 0
SHORTNESS OF BREATH: 0
CONSTIPATION: 0
CHILLS: 0
HEARTBURN: 0
DIZZINESS: 0
FREQUENCY: 0
DYSURIA: 0
COUGH: 0
WEAKNESS: 0
ABDOMINAL PAIN: 0

## 2022-03-03 ASSESSMENT — LIFESTYLE VARIABLES
HOW OFTEN DO YOU HAVE A DRINK CONTAINING ALCOHOL: 2-4 TIMES A MONTH
HOW OFTEN DO YOU HAVE SIX OR MORE DRINKS ON ONE OCCASION: NEVER
HOW MANY STANDARD DRINKS CONTAINING ALCOHOL DO YOU HAVE ON A TYPICAL DAY: 1 OR 2

## 2022-03-03 NOTE — PATIENT INSTRUCTIONS
Labs today    Keep active      Preventive Health Recommendations  Male Ages 26 - 39    Yearly exam:             See your health care provider every year in order to  o   Review health changes.   o   Discuss preventive care.    o   Review your medicines if your doctor has prescribed any.    You should be tested each year for STDs (sexually transmitted diseases), if you re at risk.     After age 35, talk to your provider about cholesterol testing. If you are at risk for heart disease, have your cholesterol tested at least every 5 years.     If you are at risk for diabetes, you should have a diabetes test (fasting glucose).  Shots: Get a flu shot each year. Get a tetanus shot every 10 years.     Nutrition:    Eat at least 5 servings of fruits and vegetables daily.     Eat whole-grain bread, whole-wheat pasta and brown rice instead of white grains and rice.     Get adequate Calcium and Vitamin D.     Lifestyle    Exercise for at least 150 minutes a week (30 minutes a day, 5 days a week). This will help you control your weight and prevent disease.     Limit alcohol to one drink per day.     No smoking.     Wear sunscreen to prevent skin cancer.     See your dentist every six months for an exam and cleaning.

## 2022-03-03 NOTE — PROGRESS NOTES
SUBJECTIVE:   CC: Jimenez Ceron is an 36 year old male who presents for preventative health visit.     Patient has been advised of split billing requirements and indicates understanding: Yes  Healthy Habits:     Getting at least 3 servings of Calcium per day:  Yes    Bi-annual eye exam:  Yes    Dental care twice a year:  NO    Sleep apnea or symptoms of sleep apnea:  None    Diet:  Regular (no restrictions)    Frequency of exercise:  2-3 days/week    Duration of exercise:  15-30 minutes    Taking medications regularly:  Not Applicable    Medication side effects:  Not applicable    PHQ-2 Total Score: 1    Additional concerns today:  No    Concerns today: vitamin D - other lab tests  FASTING today        Today's PHQ-2 Score:   PHQ-2 ( 1999 [employer]) 3/3/2022   Q1: Little interest or pleasure in doing things 1   Q2: Feeling down, depressed or hopeless 0   PHQ-2 Score 1   PHQ-2 Total Score (12-17 Years)- Positive if 3 or more points; Administer PHQ-A if positive -   Q1: Little interest or pleasure in doing things Several days   Q2: Feeling down, depressed or hopeless Not at all   PHQ-2 Score 1     Abuse: Current or Past(Physical, Sexual or Emotional)- No  Do you feel safe in your environment? Yes    Have you ever done Advance Care Planning? (For example, a Health Directive, POLST, or a discussion with a medical provider or your loved ones about your wishes): Yes, patient states has an Advance Care Planning document and will bring a copy to the clinic.    Social History     Tobacco Use     Smoking status: Never Smoker     Smokeless tobacco: Never Used   Substance Use Topics     Alcohol use: Yes     Alcohol/week: 0.0 standard drinks     Comment: occasionally     Alcohol Use 3/3/2022   Prescreen: >3 drinks/day or >7 drinks/week? No   Prescreen: >3 drinks/day or >7 drinks/week? -     Last PSA: No results found for: PSA    Reviewed orders with patient. Reviewed health maintenance and updated orders accordingly -  "Yes  Lab work is in process    Reviewed and updated as needed this visit by clinical staff   Tobacco  Allergies    Med Hx  Surg Hx  Fam Hx  Soc Hx        Reviewed and updated as needed this visit by Provider                   Hx vitamin D def and taking supplement a couple times per week    Interested in fasting labs today        Review of Systems   Constitutional: Negative for chills and fever.   HENT: Negative for congestion, ear pain, hearing loss and sore throat.    Eyes: Negative for pain and visual disturbance.   Respiratory: Negative for cough and shortness of breath.    Cardiovascular: Negative for chest pain, palpitations and peripheral edema.   Gastrointestinal: Negative for abdominal pain, constipation, diarrhea, heartburn, hematochezia and nausea.   Genitourinary: Negative for dysuria, frequency, genital sores, hematuria, impotence, penile discharge and urgency.   Musculoskeletal: Negative for arthralgias, joint swelling and myalgias.   Skin: Negative for rash.   Neurological: Negative for dizziness, weakness, headaches and paresthesias.   Psychiatric/Behavioral: Negative for mood changes. The patient is not nervous/anxious.        OBJECTIVE:   /66 (BP Location: Right arm, Cuff Size: Adult Regular)   Pulse 73   Temp 97.9  F (36.6  C) (Tympanic)   Resp 12   Ht 1.759 m (5' 9.25\")   Wt 78.6 kg (173 lb 4.8 oz)   SpO2 99%   BMI 25.41 kg/m     Wt Readings from Last 4 Encounters:   03/03/22 78.6 kg (173 lb 4.8 oz)   08/03/20 77.1 kg (170 lb)   03/29/19 76.3 kg (168 lb 4.8 oz)   10/22/18 77 kg (169 lb 11.2 oz)         Physical Exam  GENERAL: healthy, alert and no distress  EYES: Eyes grossly normal to inspection, PERRL and conjunctivae and sclerae normal  HENT: ear canals and TM's normal, nose and mouth without ulcers or lesions  NECK: no adenopathy, no asymmetry, masses, or scars and thyroid normal to palpation  RESP: lungs clear to auscultation - no rales, rhonchi or wheezes  CV: regular rate " "and rhythm, normal S1 S2, no S3 or S4, no murmur, click or rub, no peripheral edema and peripheral pulses strong  ABDOMEN: soft, nontender, no hepatosplenomegaly, no masses and bowel sounds normal  MS: no gross musculoskeletal defects noted, no edema  SKIN: no suspicious lesions or rashes  NEURO: Normal strength and tone, mentation intact and speech normal  PSYCH: mentation appears normal, affect normal/bright    Diagnostic Test Results:  pending    ASSESSMENT/PLAN:       ICD-10-CM    1. Routine history and physical examination of adult  Z00.00 Lipid panel reflex to direct LDL Fasting     Vitamin D Deficiency     HIV Antigen Antibody Combo     Hepatitis C Screen Reflex to HCV RNA Quant and Genotype     Comprehensive metabolic panel (BMP + Alb, Alk Phos, ALT, AST, Total. Bili, TP)   2. Vitamin D deficiency  E55.9 Vitamin D Deficiency   3. Screening for HIV (human immunodeficiency virus)  Z11.4 HIV Antigen Antibody Combo   4. Need for hepatitis C screening test  Z11.59 Hepatitis C Screen Reflex to HCV RNA Quant and Genotype         COUNSELING:   Reviewed preventive health counseling, as reflected in patient instructions    Estimated body mass index is 25.41 kg/m  as calculated from the following:    Height as of this encounter: 1.759 m (5' 9.25\").    Weight as of this encounter: 78.6 kg (173 lb 4.8 oz).     Weight management plan: Discussed healthy diet and exercise guidelines    He reports that he has never smoked. He has never used smokeless tobacco.      Counseling Resources:  ATP IV Guidelines  Pooled Cohorts Equation Calculator  FRAX Risk Assessment  ICSI Preventive Guidelines  Dietary Guidelines for Americans, 2010  USDA's MyPlate  ASA Prophylaxis  Lung CA Screening    Lois Gallegos MD  [facility]  "

## 2022-03-04 LAB
CHOLEST SERPL-MCNC: 181 MG/DL
DEPRECATED CALCIDIOL+CALCIFEROL SERPL-MC: 28 UG/L (ref 20–75)
FASTING STATUS PATIENT QL REPORTED: YES
HCV AB SERPL QL IA: NONREACTIVE
HDLC SERPL-MCNC: 37 MG/DL
HIV 1+2 AB+HIV1 P24 AG SERPL QL IA: NONREACTIVE
LDLC SERPL CALC-MCNC: 127 MG/DL
NONHDLC SERPL-MCNC: 144 MG/DL
TRIGL SERPL-MCNC: 87 MG/DL

## 2022-05-31 ENCOUNTER — OFFICE VISIT (OUTPATIENT)
Dept: FAMILY MEDICINE | Facility: CLINIC | Age: 37
End: 2022-05-31
Payer: COMMERCIAL

## 2022-05-31 VITALS
WEIGHT: 176 LBS | SYSTOLIC BLOOD PRESSURE: 102 MMHG | BODY MASS INDEX: 25.8 KG/M2 | TEMPERATURE: 97.9 F | OXYGEN SATURATION: 99 % | DIASTOLIC BLOOD PRESSURE: 66 MMHG | RESPIRATION RATE: 15 BRPM | HEART RATE: 75 BPM

## 2022-05-31 DIAGNOSIS — M75.41 IMPINGEMENT SYNDROME, SHOULDER, RIGHT: Primary | ICD-10-CM

## 2022-05-31 PROCEDURE — 99213 OFFICE O/P EST LOW 20 MIN: CPT | Performed by: PHYSICIAN ASSISTANT

## 2022-05-31 RX ORDER — NAPROXEN 500 MG/1
500 TABLET ORAL 2 TIMES DAILY WITH MEALS
Qty: 20 TABLET | Refills: 0 | Status: SHIPPED | OUTPATIENT
Start: 2022-05-31

## 2022-05-31 ASSESSMENT — PAIN SCALES - GENERAL: PAINLEVEL: NO PAIN (0)

## 2022-05-31 NOTE — PROGRESS NOTES
Assessment & Plan     Impingement syndrome, shoulder, right  Suspect 2/2 overuse. No xrays indicated. Start below, rest and follow up in 2 weeks if not improving  - naproxen (NAPROSYN) 500 MG tablet; Take 1 tablet (500 mg) by mouth 2 times daily (with meals)      Return in about 2 weeks (around 6/14/2022) for recheck if symptoms are not improving..    AMANDA Terrell [facility] Gaona is a 36 year old who presents for the following health issues     Shoulder Pain    History of Present Illness       Reason for visit:  Right shoulder pain. May be need x-ray s  Symptom onset:  1-3 days ago  Symptoms include:  Pain in the right shoulder  Symptom intensity:  Mild  Symptom progression:  Staying the same  Had these symptoms before:  No    He eats 2-3 servings of fruits and vegetables daily.He consumes 0 sweetened beverage(s) daily.He exercises with enough effort to increase his heart rate 20 to 29 minutes per day.  He exercises with enough effort to increase his heart rate 5 days per week.   He is taking medications regularly.    Concern - right shoulder pain  Onset: about 3-5 days ago  Description: intermittent   Intensity: mild  Progression of Symptoms:  varies  Accompanying Signs & Symptoms: none  Previous history of similar problem: no  Precipitating factors:        Worsened by: at times lifting may make it worse  Alleviating factors:        Improved by: rest   Therapies tried and outcome:  none     Jimenez Ceron is a 36 year old male who presents today for NEW right should pain   There was no specific injury but he has been doing some work around a new house  He first noted the pain a few days ago - points to the entire shoulder  Can feel heavy at times and others more sharp  Moving the arm does not necessarily make it worse  Relaxing may improve  There is NO n/t into the arm; no neck pain; no arm pain  The pain seems to come/go; unclear of cause; lasts  around 5-10 minutes  Tried some pain spray but it was ineffective      Review of Systems   Constitutional, HEENT, cardiovascular, pulmonary, gi and gu systems are negative, except as otherwise noted.      Objective    /66   Pulse 75   Temp 97.9  F (36.6  C) (Oral)   Resp 15   Wt 79.8 kg (176 lb)   SpO2 99%   BMI 25.80 kg/m    Body mass index is 25.8 kg/m .  Physical Exam   GENERAL: healthy, alert and no distress  MS: there is no swelling about the right shoulder joint. No rashes noted. Active ROM is full. Gibbons and empty can testing positive. There is no  weakness  SKIN: no suspicious lesions or rashes    No tests ordered

## 2022-09-11 ENCOUNTER — HEALTH MAINTENANCE LETTER (OUTPATIENT)
Age: 37
End: 2022-09-11

## 2023-04-30 ENCOUNTER — HEALTH MAINTENANCE LETTER (OUTPATIENT)
Age: 38
End: 2023-04-30

## 2024-05-04 ENCOUNTER — HEALTH MAINTENANCE LETTER (OUTPATIENT)
Age: 39
End: 2024-05-04